# Patient Record
Sex: FEMALE | Race: WHITE | NOT HISPANIC OR LATINO | URBAN - METROPOLITAN AREA
[De-identification: names, ages, dates, MRNs, and addresses within clinical notes are randomized per-mention and may not be internally consistent; named-entity substitution may affect disease eponyms.]

---

## 2021-09-25 ENCOUNTER — EMERGENCY (EMERGENCY)
Facility: HOSPITAL | Age: 81
LOS: 1 days | Discharge: HOME | End: 2021-09-25
Attending: EMERGENCY MEDICINE | Admitting: EMERGENCY MEDICINE
Payer: MEDICARE

## 2021-09-25 VITALS
TEMPERATURE: 100 F | HEART RATE: 114 BPM | WEIGHT: 147.93 LBS | HEIGHT: 59 IN | OXYGEN SATURATION: 94 % | SYSTOLIC BLOOD PRESSURE: 109 MMHG | RESPIRATION RATE: 18 BRPM | DIASTOLIC BLOOD PRESSURE: 74 MMHG

## 2021-09-25 VITALS — TEMPERATURE: 101 F

## 2021-09-25 DIAGNOSIS — E78.5 HYPERLIPIDEMIA, UNSPECIFIED: ICD-10-CM

## 2021-09-25 DIAGNOSIS — R07.89 OTHER CHEST PAIN: ICD-10-CM

## 2021-09-25 DIAGNOSIS — Z79.83 LONG TERM (CURRENT) USE OF BISPHOSPHONATES: ICD-10-CM

## 2021-09-25 DIAGNOSIS — R51.9 HEADACHE, UNSPECIFIED: ICD-10-CM

## 2021-09-25 DIAGNOSIS — Z86.018 PERSONAL HISTORY OF OTHER BENIGN NEOPLASM: ICD-10-CM

## 2021-09-25 DIAGNOSIS — Z98.890 OTHER SPECIFIED POSTPROCEDURAL STATES: ICD-10-CM

## 2021-09-25 DIAGNOSIS — Z79.82 LONG TERM (CURRENT) USE OF ASPIRIN: ICD-10-CM

## 2021-09-25 DIAGNOSIS — I10 ESSENTIAL (PRIMARY) HYPERTENSION: ICD-10-CM

## 2021-09-25 DIAGNOSIS — Z98.89 OTHER SPECIFIED POSTPROCEDURAL STATES: Chronic | ICD-10-CM

## 2021-09-25 DIAGNOSIS — Z20.822 CONTACT WITH AND (SUSPECTED) EXPOSURE TO COVID-19: ICD-10-CM

## 2021-09-25 DIAGNOSIS — Z88.0 ALLERGY STATUS TO PENICILLIN: ICD-10-CM

## 2021-09-25 DIAGNOSIS — R00.0 TACHYCARDIA, UNSPECIFIED: ICD-10-CM

## 2021-09-25 DIAGNOSIS — R50.9 FEVER, UNSPECIFIED: ICD-10-CM

## 2021-09-25 DIAGNOSIS — Z88.5 ALLERGY STATUS TO NARCOTIC AGENT: ICD-10-CM

## 2021-09-25 DIAGNOSIS — L03.211 CELLULITIS OF FACE: ICD-10-CM

## 2021-09-25 DIAGNOSIS — Z88.2 ALLERGY STATUS TO SULFONAMIDES: ICD-10-CM

## 2021-09-25 LAB
ALBUMIN SERPL ELPH-MCNC: 3.7 G/DL — SIGNIFICANT CHANGE UP (ref 3.4–5)
ALP SERPL-CCNC: 233 U/L — HIGH (ref 40–120)
ALT FLD-CCNC: 66 U/L — HIGH (ref 12–42)
ANION GAP SERPL CALC-SCNC: 12 MMOL/L — SIGNIFICANT CHANGE UP (ref 9–16)
APPEARANCE UR: CLEAR — SIGNIFICANT CHANGE UP
APTT BLD: 36.1 SEC — HIGH (ref 27.5–35.5)
AST SERPL-CCNC: 27 U/L — SIGNIFICANT CHANGE UP (ref 15–37)
BASOPHILS # BLD AUTO: 0.05 K/UL — SIGNIFICANT CHANGE UP (ref 0–0.2)
BASOPHILS NFR BLD AUTO: 0.3 % — SIGNIFICANT CHANGE UP (ref 0–2)
BILIRUB SERPL-MCNC: 1.7 MG/DL — HIGH (ref 0.2–1.2)
BILIRUB UR-MCNC: NEGATIVE — SIGNIFICANT CHANGE UP
BUN SERPL-MCNC: 14 MG/DL — SIGNIFICANT CHANGE UP (ref 7–23)
CALCIUM SERPL-MCNC: 9.6 MG/DL — SIGNIFICANT CHANGE UP (ref 8.5–10.5)
CHLORIDE SERPL-SCNC: 106 MMOL/L — SIGNIFICANT CHANGE UP (ref 96–108)
CO2 SERPL-SCNC: 26 MMOL/L — SIGNIFICANT CHANGE UP (ref 22–31)
COLOR SPEC: YELLOW — SIGNIFICANT CHANGE UP
CREAT SERPL-MCNC: 0.83 MG/DL — SIGNIFICANT CHANGE UP (ref 0.5–1.3)
DIFF PNL FLD: ABNORMAL
EOSINOPHIL # BLD AUTO: 0 K/UL — SIGNIFICANT CHANGE UP (ref 0–0.5)
EOSINOPHIL NFR BLD AUTO: 0 % — SIGNIFICANT CHANGE UP (ref 0–6)
FLUAV H1 2009 PAND RNA SPEC QL NAA+PROBE: SIGNIFICANT CHANGE UP
FLUAV H1 RNA SPEC QL NAA+PROBE: SIGNIFICANT CHANGE UP
FLUAV H3 RNA SPEC QL NAA+PROBE: SIGNIFICANT CHANGE UP
FLUAV SUBTYP SPEC NAA+PROBE: SIGNIFICANT CHANGE UP
FLUBV RNA SPEC QL NAA+PROBE: SIGNIFICANT CHANGE UP
GLUCOSE SERPL-MCNC: 109 MG/DL — HIGH (ref 70–99)
GLUCOSE UR QL: NEGATIVE — SIGNIFICANT CHANGE UP
HADV DNA SPEC QL NAA+PROBE: SIGNIFICANT CHANGE UP
HCOV PNL SPEC NAA+PROBE: SIGNIFICANT CHANGE UP
HCT VFR BLD CALC: 47.1 % — HIGH (ref 34.5–45)
HGB BLD-MCNC: 15.5 G/DL — SIGNIFICANT CHANGE UP (ref 11.5–15.5)
HMPV RNA SPEC QL NAA+PROBE: SIGNIFICANT CHANGE UP
HPIV1 RNA SPEC QL NAA+PROBE: SIGNIFICANT CHANGE UP
HPIV2 RNA SPEC QL NAA+PROBE: SIGNIFICANT CHANGE UP
HPIV3 RNA SPEC QL NAA+PROBE: SIGNIFICANT CHANGE UP
HPIV4 RNA SPEC QL NAA+PROBE: SIGNIFICANT CHANGE UP
IMM GRANULOCYTES NFR BLD AUTO: 0.3 % — SIGNIFICANT CHANGE UP (ref 0–1.5)
INR BLD: 1.09 — SIGNIFICANT CHANGE UP (ref 0.88–1.16)
KETONES UR-MCNC: NEGATIVE — SIGNIFICANT CHANGE UP
LACTATE SERPL-SCNC: 2 MMOL/L — SIGNIFICANT CHANGE UP (ref 0.4–2)
LEUKOCYTE ESTERASE UR-ACNC: NEGATIVE — SIGNIFICANT CHANGE UP
LYMPHOCYTES # BLD AUTO: 1.92 K/UL — SIGNIFICANT CHANGE UP (ref 1–3.3)
LYMPHOCYTES # BLD AUTO: 13.1 % — SIGNIFICANT CHANGE UP (ref 13–44)
MCHC RBC-ENTMCNC: 30.3 PG — SIGNIFICANT CHANGE UP (ref 27–34)
MCHC RBC-ENTMCNC: 32.9 GM/DL — SIGNIFICANT CHANGE UP (ref 32–36)
MCV RBC AUTO: 92 FL — SIGNIFICANT CHANGE UP (ref 80–100)
MONOCYTES # BLD AUTO: 1.15 K/UL — HIGH (ref 0–0.9)
MONOCYTES NFR BLD AUTO: 7.9 % — SIGNIFICANT CHANGE UP (ref 2–14)
NEUTROPHILS # BLD AUTO: 11.44 K/UL — HIGH (ref 1.8–7.4)
NEUTROPHILS NFR BLD AUTO: 78.4 % — HIGH (ref 43–77)
NITRITE UR-MCNC: NEGATIVE — SIGNIFICANT CHANGE UP
NRBC # BLD: 0 /100 WBCS — SIGNIFICANT CHANGE UP (ref 0–0)
NT-PROBNP SERPL-SCNC: 333 PG/ML — HIGH
PH UR: 6.5 — SIGNIFICANT CHANGE UP (ref 5–8)
PLATELET # BLD AUTO: 250 K/UL — SIGNIFICANT CHANGE UP (ref 150–400)
POTASSIUM SERPL-MCNC: 3.5 MMOL/L — SIGNIFICANT CHANGE UP (ref 3.5–5.3)
POTASSIUM SERPL-SCNC: 3.5 MMOL/L — SIGNIFICANT CHANGE UP (ref 3.5–5.3)
PROT SERPL-MCNC: 7.3 G/DL — SIGNIFICANT CHANGE UP (ref 6.4–8.2)
PROT UR-MCNC: NEGATIVE MG/DL — SIGNIFICANT CHANGE UP
PROTHROM AB SERPL-ACNC: 12.8 SEC — SIGNIFICANT CHANGE UP (ref 10.6–13.6)
RAPID RVP RESULT: SIGNIFICANT CHANGE UP
RBC # BLD: 5.12 M/UL — SIGNIFICANT CHANGE UP (ref 3.8–5.2)
RBC # FLD: 14.7 % — HIGH (ref 10.3–14.5)
RBC CASTS # UR COMP ASSIST: < 5 /HPF — SIGNIFICANT CHANGE UP
RSV RNA SPEC QL NAA+PROBE: SIGNIFICANT CHANGE UP
RV+EV RNA SPEC QL NAA+PROBE: SIGNIFICANT CHANGE UP
SARS-COV-2 RNA SPEC QL NAA+PROBE: SIGNIFICANT CHANGE UP
SODIUM SERPL-SCNC: 144 MMOL/L — SIGNIFICANT CHANGE UP (ref 132–145)
SP GR SPEC: <=1.005 — SIGNIFICANT CHANGE UP (ref 1–1.03)
TROPONIN I SERPL-MCNC: <0.017 NG/ML — LOW (ref 0.02–0.06)
TSH SERPL-MCNC: 0.57 UIU/ML — SIGNIFICANT CHANGE UP (ref 0.36–3.74)
UROBILINOGEN FLD QL: 0.2 E.U./DL — SIGNIFICANT CHANGE UP
WBC # BLD: 14.61 K/UL — HIGH (ref 3.8–10.5)
WBC # FLD AUTO: 14.61 K/UL — HIGH (ref 3.8–10.5)
WBC UR QL: < 5 /HPF — SIGNIFICANT CHANGE UP

## 2021-09-25 PROCEDURE — 70487 CT MAXILLOFACIAL W/DYE: CPT | Mod: 26,MH

## 2021-09-25 PROCEDURE — 99285 EMERGENCY DEPT VISIT HI MDM: CPT | Mod: CS,25

## 2021-09-25 PROCEDURE — 93010 ELECTROCARDIOGRAM REPORT: CPT

## 2021-09-25 PROCEDURE — 71046 X-RAY EXAM CHEST 2 VIEWS: CPT | Mod: 26

## 2021-09-25 PROCEDURE — 71046 X-RAY EXAM CHEST 2 VIEWS: CPT | Mod: 26,77

## 2021-09-25 RX ORDER — FAMOTIDINE 10 MG/ML
20 INJECTION INTRAVENOUS ONCE
Refills: 0 | Status: COMPLETED | OUTPATIENT
Start: 2021-09-25 | End: 2021-09-25

## 2021-09-25 RX ORDER — SODIUM CHLORIDE 9 MG/ML
2100 INJECTION INTRAMUSCULAR; INTRAVENOUS; SUBCUTANEOUS ONCE
Refills: 0 | Status: COMPLETED | OUTPATIENT
Start: 2021-09-25 | End: 2021-09-25

## 2021-09-25 RX ORDER — ACETAMINOPHEN 500 MG
650 TABLET ORAL ONCE
Refills: 0 | Status: COMPLETED | OUTPATIENT
Start: 2021-09-25 | End: 2021-09-25

## 2021-09-25 RX ADMIN — Medication 100 MILLIGRAM(S): at 09:57

## 2021-09-25 RX ADMIN — SODIUM CHLORIDE 2100 MILLILITER(S): 9 INJECTION INTRAMUSCULAR; INTRAVENOUS; SUBCUTANEOUS at 09:53

## 2021-09-25 RX ADMIN — Medication 650 MILLIGRAM(S): at 09:54

## 2021-09-25 RX ADMIN — FAMOTIDINE 20 MILLIGRAM(S): 10 INJECTION INTRAVENOUS at 09:56

## 2021-09-25 RX ADMIN — Medication 30 MILLILITER(S): at 09:56

## 2021-09-25 NOTE — ED PROVIDER NOTE - PATIENT PORTAL LINK FT
constant/aching
You can access the FollowMyHealth Patient Portal offered by [facility] by registering at the following website: [email or web address]. By joining [employer]’s FollowMyHealth portal, you will also be able to view your health information using other applications (apps) compatible with our system.

## 2021-09-25 NOTE — ED PROVIDER NOTE - PROGRESS NOTE DETAILS
will treat as early cellulitis in the face, CT w no fluid collection. Will recommend ENT F/U as needed.

## 2021-09-25 NOTE — ED ADULT TRIAGE NOTE - CHIEF COMPLAINT QUOTE
Patient c/o right ear pain and tenderness, also reports having substernal chest burning since last night Patient c/o right ear pain and tenderness, also reports having substernal chest burning and shortness of breath  since last night

## 2021-09-25 NOTE — ED ADULT NURSE NOTE - OBJECTIVE STATEMENT
Patient to ED with intermittent ear pain for several weeks, pain to the anterior aspect of the R ear. also some intermittent fevers recently. Last night also had some anterior chest "burning", middle of chest, non radiating. no abd pain, no n./v/d, no sore throat. Pain in the ear radiates to the R side of the head w resulting headache at times.

## 2021-09-25 NOTE — ED ADULT NURSE NOTE - CHIEF COMPLAINT QUOTE
Patient c/o right ear pain and tenderness, also reports having substernal chest burning and shortness of breath  since last night

## 2021-09-25 NOTE — ED PROVIDER NOTE - OBJECTIVE STATEMENT
81 yo female pt, hx of HTN, HLD (amlodipine, lipitor), prior surgery for acoustic neuroma. Presents for intermitent ear pain for several weeks, pain to the anterior aspect of the R ear. also some intermitent fevers recently. Last night also had some anterior chest "burning", middle of chest, non radiating. no abd pain, no n./v/d, no sorethroat. Pain in the ear radiates to the R side of the head w resulting headache at times. Had her covid vaccine. no neck pain, no focal weakness, no blurred vision.

## 2021-09-25 NOTE — ED PROVIDER NOTE - CARE PROVIDER_API CALL
Richard Martínez)  Otolaryngology  [address] [place] [address]  Phone: [phone]  Fax: [phone]  Follow Up Time:

## 2021-09-25 NOTE — ED PROVIDER NOTE - CLINICAL SUMMARY MEDICAL DECISION MAKING FREE TEXT BOX
Pt presents w fever, tachycardia, ordered labs as per sepsis protocol, fluids, fever control and broad spectrum abx. R facial mass anterior to R ear palpated. Pt presents w fever, tachycardia, ordered labs as per sepsis protocol, fluids, fever control and broad spectrum abx. R facial mass anterior to R ear palpated, unclear etiology. Will do CT max/face to characterize this.

## 2021-09-25 NOTE — ED ADULT TRIAGE NOTE - HEART RATE (BEATS/MIN)
114 Same Histology In Subsequent Stages Text: The pattern and morphology of the tumor is as described in the first stage.

## 2021-09-25 NOTE — ED PROVIDER NOTE - ENMT, MLM
Airway patent, Nasal mucosa clear. Mouth with normal mucosa. Throat has no vesicles, no oropharyngeal exudates and uvula is midline. R ear normal TM, no erythema, clear canal, mastoid area defect (prior acoustic neuroma sx), mass palpated in anterior aspect of R ear, tender, no fluctuance, no drainage

## 2021-09-27 ENCOUNTER — APPOINTMENT (OUTPATIENT)
Dept: OTOLARYNGOLOGY | Facility: CLINIC | Age: 81
End: 2021-09-27
Payer: MEDICARE

## 2021-09-27 VITALS
DIASTOLIC BLOOD PRESSURE: 82 MMHG | HEART RATE: 106 BPM | SYSTOLIC BLOOD PRESSURE: 132 MMHG | WEIGHT: 165 LBS | OXYGEN SATURATION: 94 % | HEIGHT: 59 IN | TEMPERATURE: 97.8 F | BODY MASS INDEX: 33.26 KG/M2

## 2021-09-27 DIAGNOSIS — Z82.49 FAMILY HISTORY OF ISCHEMIC HEART DISEASE AND OTHER DISEASES OF THE CIRCULATORY SYSTEM: ICD-10-CM

## 2021-09-27 DIAGNOSIS — M26.629 ARTHRALGIA OF TEMPOROMANDIBULAR JOINT,: ICD-10-CM

## 2021-09-27 DIAGNOSIS — Z82.3 FAMILY HISTORY OF STROKE: ICD-10-CM

## 2021-09-27 DIAGNOSIS — F45.8 OTHER SOMATOFORM DISORDERS: ICD-10-CM

## 2021-09-27 DIAGNOSIS — H91.91 UNSPECIFIED HEARING LOSS, RIGHT EAR: ICD-10-CM

## 2021-09-27 DIAGNOSIS — H91.92 UNSPECIFIED HEARING LOSS, LEFT EAR: ICD-10-CM

## 2021-09-27 DIAGNOSIS — Z87.891 PERSONAL HISTORY OF NICOTINE DEPENDENCE: ICD-10-CM

## 2021-09-27 DIAGNOSIS — K21.9 GASTRO-ESOPHAGEAL REFLUX DISEASE W/OUT ESOPHAGITIS: ICD-10-CM

## 2021-09-27 DIAGNOSIS — H61.23 IMPACTED CERUMEN, BILATERAL: ICD-10-CM

## 2021-09-27 PROCEDURE — 92557 COMPREHENSIVE HEARING TEST: CPT

## 2021-09-27 PROCEDURE — 92550 TYMPANOMETRY & REFLEX THRESH: CPT

## 2021-09-27 PROCEDURE — 99204 OFFICE O/P NEW MOD 45 MIN: CPT | Mod: 25

## 2021-09-27 PROCEDURE — G0268 REMOVAL OF IMPACTED WAX MD: CPT

## 2021-09-27 NOTE — ASSESSMENT
[FreeTextEntry1] : Discussed appropriate use of hearing protection & loud noise avoidance. Discussed the importance not putting qtips or other foreign objects in the ears. Annual audios sooner prn\par \par Discussed conservative TMJ treatment including hot soaks, NSAIDs, and chewing avoidance. Asked the patient to confer with their dentist regarding a possible night splint. Discussed bruxism & its possible contribution to TMJD. Reviewed relaxation exercises & a possible trial of muscle relaxants as well as possible eventual botox.\par \par Regarding her acoustic, she has an appt to see her [facility] neurologist & declines an order for a f/u MR (she missed a scheduled one during the pandemic). \par \par

## 2021-09-27 NOTE — HISTORY OF PRESENT ILLNESS
[de-identified] : Hx surgery for a R sided acoustic; this came to light at that time d/t earaches. Now w/ 6 weeks of R ear pain worse with chewing which began some period of time after a prolonged dental extraction.\par No hearing in the R ear; denies tinnitus. L ear is ok. No vertigo \par Known bruxism & her dentist has discussed a possible night splint. \par Acid reflux controlled w/ daily pantoprazole.

## 2021-09-27 NOTE — PHYSICAL EXAM
[de-identified] : extremely tender R TMJ w/o clicking; after manual reduction of the cartilage disk her pain level improved significantly  [Binocular Microscopic Exam] : Binocular microscopic exam was performed [FreeTextEntry8] : cerumen impaction removed with suction [FreeTextEntry9] : cerumen impaction removed with suction [Normal] : no rashes

## 2021-09-27 NOTE — DATA REVIEWED
[de-identified] : today: L mod-sev HFHL AS, profound loss AD\par - Immitance testing w/ type A AU\par  [de-identified] : CT max-fac reviewed primarily w/ extensive R TMJ STEFFANIE

## 2021-09-30 LAB
CULTURE RESULTS: SIGNIFICANT CHANGE UP
CULTURE RESULTS: SIGNIFICANT CHANGE UP
SPECIMEN SOURCE: SIGNIFICANT CHANGE UP
SPECIMEN SOURCE: SIGNIFICANT CHANGE UP

## 2021-10-01 RX ORDER — FAMOTIDINE 20 MG/1
20 TABLET, FILM COATED ORAL TWICE DAILY
Qty: 60 | Refills: 5 | Status: ACTIVE | COMMUNITY
Start: 2021-10-01 | End: 1900-01-01

## 2022-09-10 ENCOUNTER — EMERGENCY (EMERGENCY)
Facility: HOSPITAL | Age: 82
LOS: 1 days | Discharge: HOME | End: 2022-09-10
Admitting: EMERGENCY MEDICINE

## 2022-09-10 VITALS
RESPIRATION RATE: 16 BRPM | HEIGHT: 59 IN | TEMPERATURE: 98 F | DIASTOLIC BLOOD PRESSURE: 83 MMHG | HEART RATE: 100 BPM | SYSTOLIC BLOOD PRESSURE: 142 MMHG | WEIGHT: 154.98 LBS | OXYGEN SATURATION: 95 %

## 2022-09-10 DIAGNOSIS — Z98.89 OTHER SPECIFIED POSTPROCEDURAL STATES: Chronic | ICD-10-CM

## 2022-09-10 PROCEDURE — 73610 X-RAY EXAM OF ANKLE: CPT | Mod: 26,RT

## 2022-09-10 PROCEDURE — 73630 X-RAY EXAM OF FOOT: CPT | Mod: 26,RT

## 2022-09-10 PROCEDURE — 99283 EMERGENCY DEPT VISIT LOW MDM: CPT | Mod: 25

## 2022-09-10 NOTE — ED PROVIDER NOTE - CARE PROVIDER_API CALL
Germain Khan)  Orthopaedic Surgery  [address] [place] [address], [address]  [place] [address]  Phone: [phone]  Fax: [phone]  Follow Up Time:

## 2022-09-10 NOTE — ED PROVIDER NOTE - OBJECTIVE STATEMENT
82 y/o F with PMH of PAD presents to the ED for R ankle pain x1 week. Pt reports she was in a hurry and she may have twisted her ankle. She is able to ambulate but with pain. She denies numbness, tingling, weakness, or any additional injuries.

## 2022-09-10 NOTE — ED PROVIDER NOTE - CLINICAL SUMMARY MEDICAL DECISION MAKING FREE TEXT BOX
right foot/posterior ankle pain x 1 week, possible sprain, nvi, xrays prelim neg for fracture or dislocation, otc pain meds, supportive care, f/u ortho.

## 2022-09-10 NOTE — ED ADULT NURSE NOTE - OBJECTIVE STATEMENT
Pt aox3 with steady gait. pt states foot pain for 5 days unsure of injury walking with cane , right foot. no deformity, slight swelling

## 2022-09-10 NOTE — ED PROVIDER NOTE - PHYSICAL EXAMINATION
VITAL SIGNS: I have reviewed nursing notes and confirm.  CONSTITUTIONAL: Well-developed; well-nourished; in no acute distress.  SKIN: Skin exam is warm and dry, no acute rash.  HEAD: Normocephalic; atraumatic.  EYES: PERRL, EOM intact; conjunctiva and sclera clear.  ENT: No nasal discharge; airway clear.  NECK: Supple; non tender.  CARD: S1, S2 normal; no murmurs, gallops, or rubs. Regular rate and rhythm.  RESP: Unlabored. No wheezes, rales or rhonchi.  ABD: soft; non-distended; non-tender  MSK (RLE): Normal appearance. Mild Tenderness to palpation over the base of the 5th metatarsal and posterior ankle. DPI. Normal temperature and color.   LYMPH: No acute cervical adenopathy.  NEURO: Alert, oriented. Grossly unremarkable.  PSYCH: Cooperative, appropriate. VITAL SIGNS: I have reviewed nursing notes and confirm.  CONSTITUTIONAL: Well-developed; well-nourished; in no acute distress.  SKIN: Skin exam is warm and dry, no acute rash.  MSK (RLE): Normal appearance. Mild Tenderness to palpation over the base of the 5th metatarsal and posterior ankle. DPI. Normal temperature and color. good cap refill. ambulatory with no difficulty.   PSYCH: Cooperative, appropriate.

## 2022-09-10 NOTE — ED ADULT NURSE NOTE - NSICDXPASTMEDICALHX_GEN_ALL_CORE_FT
PAST MEDICAL HISTORY:  Acoustic neuroma     Diverticulitis of intestine without perforation or abscess with bleeding, unspecified part of intestinal tract     Duodenal mass     Hiatal hernia     Kidney stone     PAD (peripheral artery disease)

## 2022-09-10 NOTE — ED PROVIDER NOTE - PATIENT PORTAL LINK FT
You can access the FollowMyHealth Patient Portal offered by [facility] by registering at the following website: [email or web address]. By joining [employer]’s FollowMyHealth portal, you will also be able to view your health information using other applications (apps) compatible with our system.

## 2022-09-10 NOTE — ED PROVIDER NOTE - NSFOLLOWUPINSTRUCTIONS_ED_ALL_ED_FT
Take Ibuprofen 600mg every 6-8 hours as needed for pain, take with food, and in addition you may take Tylenol 500 mg every 6-8 hours as needed for pain  Rest. Cool compresses.  Extremity elevation.  Ace wrap    Follow up with Orthopedics within 1 week    Return for any concerning or worsening symptoms.

## 2022-09-12 DIAGNOSIS — Z79.82 LONG TERM (CURRENT) USE OF ASPIRIN: ICD-10-CM

## 2022-09-12 DIAGNOSIS — Z88.2 ALLERGY STATUS TO SULFONAMIDES: ICD-10-CM

## 2022-09-12 DIAGNOSIS — Z88.5 ALLERGY STATUS TO NARCOTIC AGENT: ICD-10-CM
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
## 2022-09-12 DIAGNOSIS — M25.571 PAIN IN RIGHT ANKLE AND JOINTS OF RIGHT FOOT: ICD-10-CM

## 2022-09-12 DIAGNOSIS — Z79.02 LONG TERM (CURRENT) USE OF ANTITHROMBOTICS/ANTIPLATELETS: ICD-10-CM

## 2022-09-12 DIAGNOSIS — I73.9 PERIPHERAL VASCULAR DISEASE, UNSPECIFIED: ICD-10-CM

## 2022-09-12 DIAGNOSIS — Z88.0 ALLERGY STATUS TO PENICILLIN: ICD-10-CM

## 2022-09-12 DIAGNOSIS — M79.671 PAIN IN RIGHT FOOT: ICD-10-CM
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
## 2022-10-29 NOTE — ED ADULT NURSE NOTE - CAS TRG GENERAL NORM CIRC DET
- OVER-THE-COUNTER Tylenol/Ibuprofen over the counter as needed for fever/pain  - allegra OR claritin OR zyrtec over the counter antihistamine may help with ear itching/fluid  - you can use Flonase over the counter to help with fluid behind ears/congestion/post nasal drip (one spray each nostril twice daily OR two sprays each nostril once daily).       Please arrange follow up with your primary medical clinic as soon as possible within 1-2 weeks. You must understand that you've received an Urgent Care treatment only and that you may be released before all of your medical problems are known or treated. You, the patient, will arrange for follow up as instructed. If your symptoms worsen or fail to improve you should go to the Emergency Room.     Strong peripheral pulses/Capillary refill less/equal to 2 seconds

## 2023-05-09 ENCOUNTER — EMERGENCY (EMERGENCY)
Facility: HOSPITAL | Age: 83
LOS: 1 days | Discharge: HOME | End: 2023-05-09
Attending: EMERGENCY MEDICINE | Admitting: EMERGENCY MEDICINE
Payer: MEDICARE

## 2023-05-09 VITALS
OXYGEN SATURATION: 93 % | DIASTOLIC BLOOD PRESSURE: 83 MMHG | HEART RATE: 95 BPM | WEIGHT: 149.91 LBS | TEMPERATURE: 98 F | SYSTOLIC BLOOD PRESSURE: 138 MMHG | HEIGHT: 59 IN | RESPIRATION RATE: 16 BRPM

## 2023-05-09 VITALS
OXYGEN SATURATION: 95 % | SYSTOLIC BLOOD PRESSURE: 145 MMHG | RESPIRATION RATE: 17 BRPM | TEMPERATURE: 98 F | HEART RATE: 79 BPM | DIASTOLIC BLOOD PRESSURE: 89 MMHG

## 2023-05-09 DIAGNOSIS — Z98.89 OTHER SPECIFIED POSTPROCEDURAL STATES: Chronic | ICD-10-CM

## 2023-05-09 LAB
APPEARANCE UR: CLEAR — SIGNIFICANT CHANGE UP
BACTERIA # UR AUTO: PRESENT /HPF
BILIRUB UR-MCNC: ABNORMAL
COLOR SPEC: YELLOW — SIGNIFICANT CHANGE UP
COMMENT - URINE: SIGNIFICANT CHANGE UP
DIFF PNL FLD: NEGATIVE — SIGNIFICANT CHANGE UP
EPI CELLS # UR: ABNORMAL /HPF (ref 0–5)
FLUAV H1 2009 PAND RNA SPEC QL NAA+PROBE: SIGNIFICANT CHANGE UP
FLUAV H1 RNA SPEC QL NAA+PROBE: SIGNIFICANT CHANGE UP
FLUAV H3 RNA SPEC QL NAA+PROBE: SIGNIFICANT CHANGE UP
FLUAV SUBTYP SPEC NAA+PROBE: SIGNIFICANT CHANGE UP
FLUBV RNA SPEC QL NAA+PROBE: SIGNIFICANT CHANGE UP
GLUCOSE UR QL: NEGATIVE — SIGNIFICANT CHANGE UP
HYALINE CASTS # UR AUTO: ABNORMAL /LPF (ref 0–2)
KETONES UR-MCNC: NEGATIVE — SIGNIFICANT CHANGE UP
LEUKOCYTE ESTERASE UR-ACNC: ABNORMAL
NITRITE UR-MCNC: NEGATIVE — SIGNIFICANT CHANGE UP
PH UR: 6 — SIGNIFICANT CHANGE UP (ref 5–8)
PROT UR-MCNC: NEGATIVE MG/DL — SIGNIFICANT CHANGE UP
RAPID RVP RESULT: SIGNIFICANT CHANGE UP
RBC CASTS # UR COMP ASSIST: < 5 /HPF — SIGNIFICANT CHANGE UP
SARS-COV-2 RNA SPEC QL NAA+PROBE: SIGNIFICANT CHANGE UP
SP GR SPEC: 1.02 — SIGNIFICANT CHANGE UP (ref 1–1.03)
UROBILINOGEN FLD QL: 1 E.U./DL — SIGNIFICANT CHANGE UP
WBC UR QL: ABNORMAL /HPF

## 2023-05-09 PROCEDURE — 99284 EMERGENCY DEPT VISIT MOD MDM: CPT

## 2023-05-09 PROCEDURE — 71046 X-RAY EXAM CHEST 2 VIEWS: CPT | Mod: 26

## 2023-05-09 RX ORDER — CEFUROXIME AXETIL 250 MG
1 TABLET ORAL
Qty: 14 | Refills: 0
Start: 2023-05-09 | End: 2023-05-15

## 2023-05-09 RX ORDER — OMEPRAZOLE 10 MG/1
1 CAPSULE, DELAYED RELEASE ORAL
Qty: 30 | Refills: 2
Start: 2023-05-09 | End: 2023-08-06

## 2023-05-09 RX ORDER — LEVOFLOXACIN 5 MG/ML
1 INJECTION, SOLUTION INTRAVENOUS
Qty: 5 | Refills: 0
Start: 2023-05-09 | End: 2023-05-13

## 2023-05-09 RX ORDER — BACILLUS COAGULANS/INULIN 1B-250 MG
1 CAPSULE ORAL
Qty: 30 | Refills: 2
Start: 2023-05-09 | End: 2023-08-06

## 2023-05-09 NOTE — ED PROVIDER NOTE - CARE PROVIDERS DIRECT ADDRESSES
,DirectAddress_Unknown,[email or web address]; to (do).net

## 2023-05-09 NOTE — ED PROVIDER NOTE - PATIENT PORTAL LINK FT
You can access the FollowMyHealth Patient Portal offered by [facility] by registering at the following website: [email or web address]. By joining [employer]’s FollowMyHealth portal, you will also be able to view your health information using other applications (apps) compatible with our system.

## 2023-05-09 NOTE — ED PROVIDER NOTE - PROGRESS NOTE DETAILS
Pt's RVP neg and UA looks +ve. I spoke to the patient and sent an RX for Levaquin to cover urine and pulm sources (PCN allergy- swelling). Ucx sent.

## 2023-05-09 NOTE — ED PROVIDER NOTE - CARE PROVIDER_API CALL
Lilliam Parry  GASTROENTEROLOGY  [address] [place] [address], [address]  [place] [address]  Phone: [phone]  Fax: [phone]  Follow Up Time:     Celine Seaman; MPH)  Internal Medicine  [address] [place] [address]  Phone: [phone]  Fax: [phone]  Follow Up Time:     Naomy Sanchez ()  [place] [address]  Phone: [phone]  Fax: [phone]  Follow Up Time:

## 2023-05-09 NOTE — ED PROVIDER NOTE - NSFOLLOWUPINSTRUCTIONS_ED_ALL_ED_FT
Suspected Viral Respiratory Infection    A viral respiratory infection is an illness that affects parts of the body used for breathing, like the lungs, nose, and throat. It is caused by a germ called a virus. Symptoms can include runny nose, coughing, sneezing, fatigue, body aches, sore throat, fever, or headache. Over the counter medicine can be used to manage the symptoms but the infection typically goes away on its own in 5 to 10 days.     SEEK IMMEDIATE MEDICAL CARE IF YOU HAVE ANY OF THE FOLLOWING SYMPTOMS: shortness of breath, chest pain, fever over 10 days, or lightheadedness/dizziness.     Conservative management discussed with the patient in detail.  Close PMD follow up encouraged.  Strict ED return instructions discussed in detail and patient given the opportunity to ask any questions about their discharge diagnosis.    I also suspect that you had some acid reflux likely secondary to the caffeine and the ice tea.  Please continue your Prilosec and Pepcid as we discussed.    If any of your symptoms get worse please come back to the emergency department for reassessment.    I am sending a refill for Prilosec to your pharmacy.  I will call you with the results of your urinalysis and your viral swab

## 2023-05-09 NOTE — ED PROVIDER NOTE - NSICDXPASTMEDICALHX_GEN_ALL_CORE_FT
PAST MEDICAL HISTORY:  Acoustic neuroma     Diverticulitis of intestine without perforation or abscess with bleeding, unspecified part of intestinal tract     Duodenal mass     Hiatal hernia     Kidney stone     PAD (peripheral artery disease)       HTN (hypertension)

## 2023-05-09 NOTE — ED PROVIDER NOTE - PROVIDER TOKENS
PROVIDER:[TOKEN:[[id number]]],PROVIDER:[TOKEN:[[id number]]],PROVIDER:[TOKEN:[[id number]]]

## 2023-05-09 NOTE — ED ADULT TRIAGE NOTE - CHIEF COMPLAINT QUOTE
Pt states "I had a pain below my sternum" on friday. Pt states it went away but then "had a low grade fever" the next day. Pt states the pain has returned on and off since friday. Pt states with pain she had nausea but denies vomiting or diarrhea. hx htn. Pt states she took prilosec and tylenol this am which helped with the pain.

## 2023-05-09 NOTE — ED PROVIDER NOTE - OBJECTIVE STATEMENT
83 y/o F with PMHx of HTN (on amlodipine) presents to ED c/o intermittent episodes of "burning" in the sternum x5 days. Pt states the first episode occurred on 5/5/23, 30 minutes after drinking a bottle of green tea, and resolved on its own. She has since been having similar episodes which she has noted to improve with Omeprazole. Also noting fevers/chills x3 days (Tmax 101.3). Denies N/V/D, cough, or difficulty breathing. 81 y/o F with PMHx of HTN (on amlodipine) presents to ED c/o intermittent episodes of "burning" in the sternum x5 days. Pt states the first episode occurred on 5/5/23, 30 minutes after drinking a bottle of green tea, and resolved on its own after 2-3 hours. She has since been having similar episodes which have improved with omeprazole and pepcid. Also noting fevers/chills with intermittent SOB x3 days (Tmax 101.3). Denies N/V/D, cough, or urinary symptoms. 83 y/o F with PMHx of HTN (on amlodipine) presents to ED c/o intermittent episodes of "burning" in the sternum x5 days. Pt states the first episode occurred on 5/5/23, 30 minutes after drinking a bottle of green tea, and resolved on its own after 2-3 hours. She has since been having similar episodes which have improved with omeprazole and pepcid. Also noting fevers/chills with intermittent SOB x3 days (Tmax 101.3). Denies N/V/D, cough, or urinary symptoms.    Phone #: [phone], [phone]

## 2023-05-09 NOTE — ED PROVIDER NOTE - CLINICAL SUMMARY MEDICAL DECISION MAKING FREE TEXT BOX
81 y/o F presents to ED c/o intermittent episodes of "burning" under the sternum which have improved with omeprazole and pepcid. Also with fevers/chills and some SOB x3 days. Exam is unremarkable. Will check CXR, RVP, UA, and reassess. 83 y/o F presents to ED c/o intermittent episodes of "burning" under the sternum which have improved with Omeprazole and Pepcid. Also with fevers/chills x 1d and some on/off malaise, fatigue, bodyaches, SOB x3 days. Exam is unremarkable. Will check CXR, RVP, UA, and reassess. I suspect a viral syndrome with superimposed GERD.

## 2023-05-11 DIAGNOSIS — Z20.822 CONTACT WITH AND (SUSPECTED) EXPOSURE TO COVID-19: ICD-10-CM

## 2023-05-11 DIAGNOSIS — Z87.891 PERSONAL HISTORY OF NICOTINE DEPENDENCE: ICD-10-CM

## 2023-05-11 DIAGNOSIS — Z88.0 ALLERGY STATUS TO PENICILLIN: ICD-10-CM

## 2023-05-11 DIAGNOSIS — Z88.2 ALLERGY STATUS TO SULFONAMIDES: ICD-10-CM

## 2023-05-11 DIAGNOSIS — Z79.02 LONG TERM (CURRENT) USE OF ANTITHROMBOTICS/ANTIPLATELETS: ICD-10-CM

## 2023-05-11 DIAGNOSIS — I49.3 VENTRICULAR PREMATURE DEPOLARIZATION: ICD-10-CM

## 2023-05-11 DIAGNOSIS — R50.9 FEVER, UNSPECIFIED: ICD-10-CM

## 2023-05-11 DIAGNOSIS — Z79.82 LONG TERM (CURRENT) USE OF ASPIRIN: ICD-10-CM

## 2023-05-11 DIAGNOSIS — B34.9 VIRAL INFECTION, UNSPECIFIED: ICD-10-CM

## 2023-05-11 DIAGNOSIS — K21.9 GASTRO-ESOPHAGEAL REFLUX DISEASE WITHOUT ESOPHAGITIS: ICD-10-CM

## 2023-05-11 DIAGNOSIS — Z87.442 PERSONAL HISTORY OF URINARY CALCULI: ICD-10-CM

## 2023-05-11 DIAGNOSIS — I73.9 PERIPHERAL VASCULAR DISEASE, UNSPECIFIED: ICD-10-CM

## 2023-05-11 DIAGNOSIS — I10 ESSENTIAL (PRIMARY) HYPERTENSION: ICD-10-CM

## 2023-05-11 DIAGNOSIS — I49.8 OTHER SPECIFIED CARDIAC ARRHYTHMIAS: ICD-10-CM

## 2023-05-11 LAB
CULTURE RESULTS: SIGNIFICANT CHANGE UP
SPECIMEN SOURCE: SIGNIFICANT CHANGE UP

## 2023-05-12 NOTE — ED POST DISCHARGE NOTE - RESULT SUMMARY
>3 org in UCx, contaminated. Pt treated w/ Levaquin for concern of poss  source for symptoms - called patient to f/u if her symptoms improved - LVM.

## 2023-05-21 ENCOUNTER — EMERGENCY (EMERGENCY)
Facility: HOSPITAL | Age: 83
LOS: 1 days | Discharge: HOME | End: 2023-05-21
Admitting: EMERGENCY MEDICINE
Payer: MEDICARE

## 2023-05-21 VITALS
RESPIRATION RATE: 18 BRPM | DIASTOLIC BLOOD PRESSURE: 83 MMHG | OXYGEN SATURATION: 95 % | SYSTOLIC BLOOD PRESSURE: 135 MMHG | TEMPERATURE: 98 F | HEART RATE: 65 BPM

## 2023-05-21 VITALS
HEIGHT: 59 IN | SYSTOLIC BLOOD PRESSURE: 164 MMHG | TEMPERATURE: 98 F | OXYGEN SATURATION: 94 % | DIASTOLIC BLOOD PRESSURE: 85 MMHG | WEIGHT: 149.91 LBS | HEART RATE: 91 BPM | RESPIRATION RATE: 16 BRPM

## 2023-05-21 DIAGNOSIS — Z98.89 OTHER SPECIFIED POSTPROCEDURAL STATES: Chronic | ICD-10-CM

## 2023-05-21 PROBLEM — I10 ESSENTIAL (PRIMARY) HYPERTENSION: Chronic | Status: ACTIVE | Noted: 2023-05-09

## 2023-05-21 LAB
APPEARANCE UR: CLEAR — SIGNIFICANT CHANGE UP
BACTERIA # UR AUTO: ABNORMAL /HPF
BILIRUB UR-MCNC: NEGATIVE — SIGNIFICANT CHANGE UP
COLOR SPEC: YELLOW — SIGNIFICANT CHANGE UP
DIFF PNL FLD: NEGATIVE — SIGNIFICANT CHANGE UP
EPI CELLS # UR: ABNORMAL /HPF (ref 0–5)
GLUCOSE UR QL: NEGATIVE — SIGNIFICANT CHANGE UP
KETONES UR-MCNC: ABNORMAL MG/DL
LEUKOCYTE ESTERASE UR-ACNC: ABNORMAL
NITRITE UR-MCNC: NEGATIVE — SIGNIFICANT CHANGE UP
PH UR: 5.5 — SIGNIFICANT CHANGE UP (ref 5–8)
PROT UR-MCNC: NEGATIVE MG/DL — SIGNIFICANT CHANGE UP
RBC CASTS # UR COMP ASSIST: < 5 /HPF — SIGNIFICANT CHANGE UP
SP GR SPEC: 1.02 — SIGNIFICANT CHANGE UP (ref 1–1.03)
UROBILINOGEN FLD QL: 0.2 E.U./DL — SIGNIFICANT CHANGE UP
WBC UR QL: > 10 /HPF

## 2023-05-21 PROCEDURE — 99284 EMERGENCY DEPT VISIT MOD MDM: CPT

## 2023-05-21 PROCEDURE — 71045 X-RAY EXAM CHEST 1 VIEW: CPT | Mod: 26

## 2023-05-21 RX ORDER — CEFUROXIME AXETIL 250 MG
1 TABLET ORAL
Qty: 20 | Refills: 0
Start: 2023-05-21 | End: 2023-05-30

## 2023-05-21 RX ORDER — SUCRALFATE 1 G
1 TABLET ORAL ONCE
Refills: 0 | Status: COMPLETED | OUTPATIENT
Start: 2023-05-21 | End: 2023-05-21

## 2023-05-21 RX ORDER — FAMOTIDINE 10 MG/ML
20 INJECTION INTRAVENOUS ONCE
Refills: 0 | Status: COMPLETED | OUTPATIENT
Start: 2023-05-21 | End: 2023-05-21

## 2023-05-21 RX ADMIN — Medication 1 GRAM(S): at 12:43

## 2023-05-21 RX ADMIN — Medication 30 MILLILITER(S): at 12:44

## 2023-05-21 RX ADMIN — FAMOTIDINE 20 MILLIGRAM(S): 10 INJECTION INTRAVENOUS at 12:43

## 2023-05-21 NOTE — ED PROVIDER NOTE - CLINICAL SUMMARY MEDICAL DECISION MAKING FREE TEXT BOX
Patient here with multiple complaints of reflux and increased urination.   Symptomatically treated and relieved     Will send abx for UTI    Asymptomatic at time of discharge

## 2023-05-21 NOTE — ED PROVIDER NOTE - PATIENT PORTAL LINK FT
You can access the FollowMyHealth Patient Portal offered by [facility] by registering at the following website: [email or web address]. By joining [employer]’s FollowMyHealth portal, you will also be able to view your health information using other applications (apps) compatible with our system.

## 2023-05-21 NOTE — ED ADULT TRIAGE NOTE - CHIEF COMPLAINT QUOTE
epigastric pain since last pm, pmh of gerds, pt states pain feels the same as her normal acid reflux

## 2023-05-21 NOTE — ED PROVIDER NOTE - OBJECTIVE STATEMENT
83 y/o female here stating her reflux symptoms have been worsened since last night. Patient also endorses increased urination. Recently treated for PNA and UTI. Appearing well and vibrant. Denies fever, chills, hematuria, vaginal bleeding, d/c, abdominal pain, change in bowel function, flank pain, rash, HA, dizziness, SOB, CP, palpitations, diaphoresis, cough, and malaise.

## 2023-05-21 NOTE — ED PROVIDER NOTE - NSICDXPASTMEDICALHX_GEN_ALL_CORE_FT
PAST MEDICAL HISTORY:  Acoustic neuroma     Diverticulitis of intestine without perforation or abscess with bleeding, unspecified part of intestinal tract     Duodenal mass     Hiatal hernia     HTN (hypertension)     Kidney stone     PAD (peripheral artery disease)

## 2023-05-21 NOTE — ED ADULT NURSE NOTE - NSFALLUNIVINTERV_ED_ALL_ED
Bed/Stretcher in lowest position, wheels locked, appropriate side rails in place/Call bell, personal items and telephone in reach/Instruct patient to call for assistance before getting out of bed/chair/stretcher/Non-slip footwear applied when patient is off stretcher/[place] to call system/Physically safe environment - no spills, clutter or unnecessary equipment/Purposeful proactive rounding/Room/bathroom lighting operational, light cord in reach

## 2023-05-23 ENCOUNTER — INPATIENT (INPATIENT)
Facility: HOSPITAL | Age: 83
LOS: 2 days | Discharge: HOME | DRG: 247 | End: 2023-05-26
Attending: INTERNAL MEDICINE | Admitting: INTERNAL MEDICINE
Payer: MEDICARE

## 2023-05-23 VITALS
TEMPERATURE: 98 F | HEIGHT: 59 IN | RESPIRATION RATE: 18 BRPM | HEART RATE: 84 BPM | SYSTOLIC BLOOD PRESSURE: 168 MMHG | WEIGHT: 149.91 LBS | DIASTOLIC BLOOD PRESSURE: 89 MMHG | OXYGEN SATURATION: 94 %

## 2023-05-23 DIAGNOSIS — Z98.89 OTHER SPECIFIED POSTPROCEDURAL STATES: Chronic | ICD-10-CM

## 2023-05-23 DIAGNOSIS — I21.4 NON-ST ELEVATION (NSTEMI) MYOCARDIAL INFARCTION: ICD-10-CM

## 2023-05-23 DIAGNOSIS — I10 ESSENTIAL (PRIMARY) HYPERTENSION: ICD-10-CM

## 2023-05-23 DIAGNOSIS — E78.5 HYPERLIPIDEMIA, UNSPECIFIED: ICD-10-CM

## 2023-05-23 LAB
A1C WITH ESTIMATED AVERAGE GLUCOSE RESULT: 5.2 % — SIGNIFICANT CHANGE UP (ref 4–5.6)
ALBUMIN SERPL ELPH-MCNC: 3.4 G/DL — SIGNIFICANT CHANGE UP (ref 3.4–5)
ALP SERPL-CCNC: 134 U/L — HIGH (ref 40–120)
ALT FLD-CCNC: 28 U/L — SIGNIFICANT CHANGE UP (ref 12–42)
ANION GAP SERPL CALC-SCNC: 3 MMOL/L — LOW (ref 9–16)
APPEARANCE UR: CLEAR — SIGNIFICANT CHANGE UP
APTT BLD: 34 SEC — SIGNIFICANT CHANGE UP (ref 27.5–35.5)
AST SERPL-CCNC: 64 U/L — HIGH (ref 15–37)
BACTERIA # UR AUTO: PRESENT /HPF
BASE EXCESS BLDA CALC-SCNC: 1.7 MMOL/L — SIGNIFICANT CHANGE UP (ref -2–3)
BASOPHILS # BLD AUTO: 0.05 K/UL — SIGNIFICANT CHANGE UP (ref 0–0.2)
BASOPHILS NFR BLD AUTO: 0.5 % — SIGNIFICANT CHANGE UP (ref 0–2)
BILIRUB SERPL-MCNC: 1.2 MG/DL — SIGNIFICANT CHANGE UP (ref 0.2–1.2)
BILIRUB UR-MCNC: NEGATIVE — SIGNIFICANT CHANGE UP
BUN SERPL-MCNC: 14 MG/DL — SIGNIFICANT CHANGE UP (ref 7–23)
CA-I BLDA-SCNC: 1.13 MMOL/L — LOW (ref 1.15–1.33)
CALCIUM SERPL-MCNC: 9 MG/DL — SIGNIFICANT CHANGE UP (ref 8.5–10.5)
CHLORIDE SERPL-SCNC: 107 MMOL/L — SIGNIFICANT CHANGE UP (ref 96–108)
CHOLEST SERPL-MCNC: 210 MG/DL — HIGH
CK MB CFR SERPL CALC: 53.4 NG/ML — HIGH (ref 0–6.7)
CK SERPL-CCNC: 439 U/L — HIGH (ref 25–170)
CO2 BLDA-SCNC: 28 MMOL/L — HIGH (ref 19–24)
CO2 SERPL-SCNC: 33 MMOL/L — HIGH (ref 22–31)
COHGB MFR BLDA: 2 % — SIGNIFICANT CHANGE UP
COHGB MFR BLDA: 2.3 % — SIGNIFICANT CHANGE UP
COHGB MFR BLDV: 1.7 % — SIGNIFICANT CHANGE UP
COLOR SPEC: YELLOW — SIGNIFICANT CHANGE UP
CREAT SERPL-MCNC: 0.73 MG/DL — SIGNIFICANT CHANGE UP (ref 0.5–1.3)
DIFF PNL FLD: NEGATIVE — SIGNIFICANT CHANGE UP
EGFR: 81 ML/MIN/1.73M2 — SIGNIFICANT CHANGE UP
EOSINOPHIL # BLD AUTO: 0.01 K/UL — SIGNIFICANT CHANGE UP (ref 0–0.5)
EOSINOPHIL NFR BLD AUTO: 0.1 % — SIGNIFICANT CHANGE UP (ref 0–6)
EPI CELLS # UR: ABNORMAL /HPF (ref 0–5)
ESTIMATED AVERAGE GLUCOSE: 103 MG/DL — SIGNIFICANT CHANGE UP (ref 68–114)
GLUCOSE BLDA-MCNC: 99 MG/DL — SIGNIFICANT CHANGE UP (ref 70–99)
GLUCOSE BLDC GLUCOMTR-MCNC: 97 MG/DL — SIGNIFICANT CHANGE UP (ref 70–99)
GLUCOSE SERPL-MCNC: 100 MG/DL — HIGH (ref 70–99)
GLUCOSE UR QL: NEGATIVE — SIGNIFICANT CHANGE UP
HCO3 BLDA-SCNC: 27 MMOL/L — SIGNIFICANT CHANGE UP (ref 21–28)
HCT VFR BLD CALC: 47.9 % — HIGH (ref 34.5–45)
HCT VFR BLDA CALC: 42 % — SIGNIFICANT CHANGE UP
HDLC SERPL-MCNC: 45 MG/DL — LOW
HGB BLD CALC-MCNC: 14 G/DL — SIGNIFICANT CHANGE UP (ref 11.7–16.1)
HGB BLD-MCNC: 15.6 G/DL — HIGH (ref 11.5–15.5)
HGB BLDA-MCNC: 13.9 G/DL — SIGNIFICANT CHANGE UP (ref 11.7–16.1)
HGB BLDA-MCNC: 14.7 G/DL — SIGNIFICANT CHANGE UP (ref 11.7–16.1)
IMM GRANULOCYTES NFR BLD AUTO: 0.3 % — SIGNIFICANT CHANGE UP (ref 0–0.9)
INR BLD: 1.08 — SIGNIFICANT CHANGE UP (ref 0.88–1.16)
ISTAT ACTK (ACTIVATED CLOTTING TIME KAOLIN): 143 SEC — HIGH (ref 74–137)
ISTAT ACTK (ACTIVATED CLOTTING TIME KAOLIN): 317 SEC — HIGH (ref 74–137)
KETONES UR-MCNC: ABNORMAL MG/DL
LEUKOCYTE ESTERASE UR-ACNC: ABNORMAL
LG PLATELETS BLD QL AUTO: SLIGHT — SIGNIFICANT CHANGE UP
LIPID PNL WITH DIRECT LDL SERPL: 148 MG/DL — HIGH
LYMPHOCYTES # BLD AUTO: 1.16 K/UL — SIGNIFICANT CHANGE UP (ref 1–3.3)
LYMPHOCYTES # BLD AUTO: 12.4 % — LOW (ref 13–44)
MANUAL SMEAR VERIFICATION: SIGNIFICANT CHANGE UP
MCHC RBC-ENTMCNC: 30.5 PG — SIGNIFICANT CHANGE UP (ref 27–34)
MCHC RBC-ENTMCNC: 32.6 GM/DL — SIGNIFICANT CHANGE UP (ref 32–36)
MCV RBC AUTO: 93.7 FL — SIGNIFICANT CHANGE UP (ref 80–100)
METHGB MFR BLDA: 0.6 % — SIGNIFICANT CHANGE UP
METHGB MFR BLDA: 0.7 % — SIGNIFICANT CHANGE UP
METHGB MFR BLDV: 0.8 % — SIGNIFICANT CHANGE UP
MONOCYTES # BLD AUTO: 0.48 K/UL — SIGNIFICANT CHANGE UP (ref 0–0.9)
MONOCYTES NFR BLD AUTO: 5.2 % — SIGNIFICANT CHANGE UP (ref 2–14)
NEUTROPHILS # BLD AUTO: 7.59 K/UL — HIGH (ref 1.8–7.4)
NEUTROPHILS NFR BLD AUTO: 81.5 % — HIGH (ref 43–77)
NITRITE UR-MCNC: NEGATIVE — SIGNIFICANT CHANGE UP
NON HDL CHOLESTEROL: 165 MG/DL — HIGH
NRBC # BLD: 0 /100 WBCS — SIGNIFICANT CHANGE UP (ref 0–0)
NT-PROBNP SERPL-SCNC: 2686 PG/ML — HIGH
OXYHGB MFR BLDA: 84.7 % — LOW (ref 90–95)
OXYHGB MFR BLDA: 87.8 % — LOW (ref 90–95)
PCO2 BLDA: 42 MMHG — SIGNIFICANT CHANGE UP (ref 32–45)
PH BLDA: 7.41 — SIGNIFICANT CHANGE UP (ref 7.35–7.45)
PH UR: 7.5 — SIGNIFICANT CHANGE UP (ref 5–8)
PLAT MORPH BLD: NORMAL — SIGNIFICANT CHANGE UP
PLATELET # BLD AUTO: 228 K/UL — SIGNIFICANT CHANGE UP (ref 150–400)
PLATELET COUNT - ESTIMATE: NORMAL — SIGNIFICANT CHANGE UP
PO2 BLDA: 53 MMHG — LOW (ref 83–108)
POTASSIUM BLDA-SCNC: 3.3 MMOL/L — LOW (ref 3.5–5.1)
POTASSIUM SERPL-MCNC: 4.3 MMOL/L — SIGNIFICANT CHANGE UP (ref 3.5–5.3)
POTASSIUM SERPL-SCNC: 4.3 MMOL/L — SIGNIFICANT CHANGE UP (ref 3.5–5.3)
PROT SERPL-MCNC: 6.8 G/DL — SIGNIFICANT CHANGE UP (ref 6.4–8.2)
PROT UR-MCNC: NEGATIVE MG/DL — SIGNIFICANT CHANGE UP
PROTHROM AB SERPL-ACNC: 12.5 SEC — SIGNIFICANT CHANGE UP (ref 10.5–13.4)
RBC # BLD: 5.11 M/UL — SIGNIFICANT CHANGE UP (ref 3.8–5.2)
RBC # FLD: 14.5 % — SIGNIFICANT CHANGE UP (ref 10.3–14.5)
RBC BLD AUTO: NORMAL — SIGNIFICANT CHANGE UP
RBC CASTS # UR COMP ASSIST: < 5 /HPF — SIGNIFICANT CHANGE UP
SAO2 % BLDA: 87.3 % — LOW (ref 94–98)
SAO2 % BLDA: 90.2 % — LOW (ref 94–98)
SAO2 % BLDV: 66 % — LOW (ref 67–88)
SODIUM BLDA-SCNC: 138 MMOL/L — SIGNIFICANT CHANGE UP (ref 136–145)
SODIUM SERPL-SCNC: 143 MMOL/L — SIGNIFICANT CHANGE UP (ref 132–145)
SP GR SPEC: 1.01 — SIGNIFICANT CHANGE UP (ref 1–1.03)
TRIGL SERPL-MCNC: 87 MG/DL — SIGNIFICANT CHANGE UP
TROPONIN I, HIGH SENSITIVITY RESULT: 4622.3 NG/L — HIGH
TROPONIN I, HIGH SENSITIVITY RESULT: 6008.7 NG/L — HIGH
TROPONIN T SERPL-MCNC: 0.88 NG/ML — CRITICAL HIGH (ref 0–0.01)
UROBILINOGEN FLD QL: 0.2 E.U./DL — SIGNIFICANT CHANGE UP
WBC # BLD: 9.32 K/UL — SIGNIFICANT CHANGE UP (ref 3.8–10.5)
WBC # FLD AUTO: 9.32 K/UL — SIGNIFICANT CHANGE UP (ref 3.8–10.5)
WBC UR QL: ABNORMAL /HPF

## 2023-05-23 PROCEDURE — 93010 ELECTROCARDIOGRAM REPORT: CPT

## 2023-05-23 PROCEDURE — 99291 CRITICAL CARE FIRST HOUR: CPT | Mod: FS

## 2023-05-23 PROCEDURE — 71045 X-RAY EXAM CHEST 1 VIEW: CPT | Mod: 26

## 2023-05-23 RX ORDER — SODIUM CHLORIDE 9 MG/ML
1000 INJECTION INTRAMUSCULAR; INTRAVENOUS; SUBCUTANEOUS
Refills: 0 | Status: DISCONTINUED | OUTPATIENT
Start: 2023-05-23 | End: 2023-05-23

## 2023-05-23 RX ORDER — HEPARIN SODIUM 5000 [USP'U]/ML
INJECTION INTRAVENOUS; SUBCUTANEOUS
Qty: 25000 | Refills: 0 | Status: DISCONTINUED | OUTPATIENT
Start: 2023-05-23 | End: 2023-05-23

## 2023-05-23 RX ORDER — PANTOPRAZOLE SODIUM 20 MG/1
40 TABLET, DELAYED RELEASE ORAL
Refills: 0 | Status: DISCONTINUED | OUTPATIENT
Start: 2023-05-23 | End: 2023-05-24

## 2023-05-23 RX ORDER — HEPARIN SODIUM 5000 [USP'U]/ML
4100 INJECTION INTRAVENOUS; SUBCUTANEOUS EVERY 6 HOURS
Refills: 0 | Status: DISCONTINUED | OUTPATIENT
Start: 2023-05-23 | End: 2023-05-23

## 2023-05-23 RX ORDER — ONDANSETRON 8 MG/1
4 TABLET, FILM COATED ORAL ONCE
Refills: 0 | Status: COMPLETED | OUTPATIENT
Start: 2023-05-23 | End: 2023-05-23

## 2023-05-23 RX ORDER — CLOPIDOGREL BISULFATE 75 MG/1
75 TABLET, FILM COATED ORAL DAILY
Refills: 0 | Status: DISCONTINUED | OUTPATIENT
Start: 2023-05-24 | End: 2023-05-24

## 2023-05-23 RX ORDER — AMLODIPINE BESYLATE 2.5 MG/1
2.5 TABLET ORAL DAILY
Refills: 0 | Status: DISCONTINUED | OUTPATIENT
Start: 2023-05-24 | End: 2023-05-24

## 2023-05-23 RX ORDER — CLOPIDOGREL BISULFATE 75 MG/1
600 TABLET, FILM COATED ORAL ONCE
Refills: 0 | Status: COMPLETED | OUTPATIENT
Start: 2023-05-23 | End: 2023-05-23

## 2023-05-23 RX ORDER — ASPIRIN/CALCIUM CARB/MAGNESIUM 324 MG
81 TABLET ORAL DAILY
Refills: 0 | Status: DISCONTINUED | OUTPATIENT
Start: 2023-05-24 | End: 2023-05-24

## 2023-05-23 RX ORDER — ATORVASTATIN CALCIUM 80 MG/1
40 TABLET, FILM COATED ORAL AT BEDTIME
Refills: 0 | Status: DISCONTINUED | OUTPATIENT
Start: 2023-05-23 | End: 2023-05-24

## 2023-05-23 RX ORDER — METOPROLOL TARTRATE 50 MG
25 TABLET ORAL DAILY
Refills: 0 | Status: DISCONTINUED | OUTPATIENT
Start: 2023-05-23 | End: 2023-05-24

## 2023-05-23 RX ORDER — LIDOCAINE 4 G/100G
5 CREAM TOPICAL ONCE
Refills: 0 | Status: COMPLETED | OUTPATIENT
Start: 2023-05-23 | End: 2023-05-23

## 2023-05-23 RX ORDER — HEPARIN SODIUM 5000 [USP'U]/ML
4100 INJECTION INTRAVENOUS; SUBCUTANEOUS ONCE
Refills: 0 | Status: COMPLETED | OUTPATIENT
Start: 2023-05-23 | End: 2023-05-23

## 2023-05-23 RX ADMIN — HEPARIN SODIUM 800 UNIT(S)/HR: 5000 INJECTION INTRAVENOUS; SUBCUTANEOUS at 14:00

## 2023-05-23 RX ADMIN — LIDOCAINE 5 MILLILITER(S): 4 CREAM TOPICAL at 11:35

## 2023-05-23 RX ADMIN — Medication 30 MILLILITER(S): at 11:35

## 2023-05-23 RX ADMIN — SODIUM CHLORIDE 200 MILLILITER(S): 9 INJECTION INTRAMUSCULAR; INTRAVENOUS; SUBCUTANEOUS at 11:34

## 2023-05-23 RX ADMIN — HEPARIN SODIUM 4100 UNIT(S): 5000 INJECTION INTRAVENOUS; SUBCUTANEOUS at 14:06

## 2023-05-23 RX ADMIN — ONDANSETRON 4 MILLIGRAM(S): 8 TABLET, FILM COATED ORAL at 23:39

## 2023-05-23 RX ADMIN — ATORVASTATIN CALCIUM 40 MILLIGRAM(S): 80 TABLET, FILM COATED ORAL at 22:23

## 2023-05-23 RX ADMIN — ONDANSETRON 4 MILLIGRAM(S): 8 TABLET, FILM COATED ORAL at 11:34

## 2023-05-23 RX ADMIN — CLOPIDOGREL BISULFATE 600 MILLIGRAM(S): 75 TABLET, FILM COATED ORAL at 16:19

## 2023-05-23 RX ADMIN — ONDANSETRON 4 MILLIGRAM(S): 8 TABLET, FILM COATED ORAL at 19:14

## 2023-05-23 NOTE — ED PROVIDER NOTE - CLINICAL SUMMARY MEDICAL DECISION MAKING FREE TEXT BOX
pt presents with c/o chest pain, dizziness, nausea, vomiting. reports pressure, vomiting, and dizziness were not present on previous visits this month. will obtain labs, cxr, ekg. ekg with some TWI and labs with elevated trop. repeat trop is trending up. called cardio Dr. Galindo who is requesting admission to cath lab. heparin ordered. already on statin. [facility] and si[facility] transfer.

## 2023-05-23 NOTE — H&P ADULT - HISTORY OF PRESENT ILLNESS
Cards:   Pharm: [facility]  ([phone])   Escort: Wife       81yo English speaking F with PMHX of  forme tobacco use (quit 30 years ago), HLD, HTN, PAD (s/p x1 stent in R LE), and GERD presented to [facility] ED today (5/23/23) c/o indigestion nausea/vomiting associated with pressure like-SSCP w/ +rad to jaw/L arm, SOB and dizziness starting at 2 am.  Patient took her home Pepcid without relief of symptoms. Patient reports indigestion sx with associated CP has been intermittent for the last month but a stronger pressure-like CP sensation along with new onset of dizziness prompted her ED visit today. PT also reports an increase in b/l LE swelling. Patient OSEI, palpitations, LOC, diarrhea, fever/chills/sick contact, diaphoresis, orthopnea/PND.     At [facility]:   -EKG NSR at 83 bpm, with left axis deviation and biphasic T waves in leads III, AVF and TWI in V5, V6.   -Labs: H&H stable, Toponin I (4622.3 @11:13 am and 6008.7 @12:52 pm), coag studies wnl, pro-BNP 2686).   -TX: Heparin gtt ______, Zofran 4 mg IVP,  mg PO, NSS @ _____,       Upon arrival to [facility], VSS (HR: 87, BP: 154/60, SP02: 97% RA, RR 15, T: 99.2F).  In light of patient's risk factors, abnormal EKG and Troponin results, and CCS class IV anginal symptoms, patient is transferred to [facility] for cardiac catheterization w/ possible intervention if clinically indicated.       81yo English speaking F with PMHX of  former tobacco use (quit 30 years ago), HLD, HTN, PAD (s/p x1 stent in R LE), and GERD presented to [facility] ED today (5/23/23) c/o indigestion nausea/vomiting associated with pressure like-SSCP w/ +rad to jaw/L arm, SOB and dizziness starting at 2 am.  Patient took her home Pepcid without relief of symptoms. Patient reports indigestion sx with associated CP has been intermittent for the last month but a stronger pressure-like CP sensation along with new onset of dizziness prompted her ED visit today. PT also reports an increase in b/l LE swelling. Patient OSEI, palpitations, LOC, diarrhea, fever/chills/sick contact, diaphoresis, orthopnea/PND.     At [facility]:   -EKG NSR at 83 bpm, with left axis deviation and biphasic T waves in leads III, AVF and TWI in V5, V6.   -Labs: H&H stable, Toponin I (4622.3 @11:13 am and 6008.7 @12:52 pm), coag studies wnl, pro-BNP 2686).   -TX: Heparin gtt, Zofran 4 mg IVP,  mg PO (via EMS), NS 200cc/hr continuously during transfer.    Patient transferred to [facility] for further treatment of NSTEMI with plan for [facility] w/ possible PCI.

## 2023-05-23 NOTE — H&P ADULT - NSHPLABSRESULTS_GEN_ALL_CORE
15.6   9.32  )-----------( 228      ( 23 May 2023 11:13 )             47.9           143  |  107  |  14  ----------------------------<  100<H>  4.3   |  33<H>  |  0.73    Ca    9.0      23 May 2023 11:13    TPro  6.8  /  Alb  3.4  /  TBili  1.2  /  DBili  x   /  AST  64<H>  /  ALT  28  /  AlkPhos  134<H>        PT/INR - ( 23 May 2023 11:13 )   PT: 12.5 sec;   INR: 1.08          PTT - ( 23 May 2023 11:13 )  PTT:34.0 sec          Urinalysis Basic - ( 23 May 2023 12:48 )    Color: Yellow / Appearance: Clear / S.015 / pH: x  Gluc: x / Ketone: Trace mg/dL  / Bili: NEGATIVE / Urobili: 0.2 E.U./dL   Blood: x / Protein: NEGATIVE mg/dL / Nitrite: NEGATIVE   Leuk Esterase: Small / RBC: < 5 /HPF / WBC 5-10 /HPF   Sq Epi: x / Non Sq Epi: x / Bacteria: Present /HPF        EKG: NSR at 98  bpm, with left axis deviation and biphasic T waves in leads III, AVF and TWI in V5, V6.

## 2023-05-23 NOTE — PATIENT PROFILE ADULT - FALL HARM RISK - HARM RISK INTERVENTIONS
Assistance with ambulation/Assistance OOB with selected safe patient handling equipment/Communicate Risk of Fall with Harm to all staff/Discuss with provider need for PT consult/Monitor gait and stability/Provide patient with walking aids - walker, cane, crutches/Reinforce activity limits and safety measures with patient and family/Sit up slowly, dangle for a short time, stand at bedside before walking/Tailored Fall Risk Interventions/Use of alarms - bed, chair and/or voice tab/Visual Cue: Yellow wristband and red socks/Bed in lowest position, wheels locked, appropriate side rails in place/Call bell, personal items and telephone in reach/Instruct patient to call for assistance before getting out of bed or chair/Non-slip footwear when patient is out of bed/[place] to call system/Physically safe environment - no spills, clutter or unnecessary equipment/Purposeful Proactive Rounding/Room/bathroom lighting operational, light cord in reach

## 2023-05-23 NOTE — ED ADULT TRIAGE NOTE - AS HEIGHT TYPE
BP now 121/60.   Medications again reviewed and pt is currently taking:    Carvedilol 25mg BID, AM and Dinner  Doxazosin 1mg BID, AM/HS  Nifedipine 60mg in AM  Lisinopril 10mg at HS  Imdur 30mg at HS    Hydralazine at lunch prn for SBP greater than 150 (does not usually need to take).     Pt continues to deny hypertensive symptoms.     Again reviewed things that can affect BP - position, fluid status, stress etc.     Routed to Dr Newby  Please advise if any changes recommended at this time.   stated

## 2023-05-23 NOTE — ED PROVIDER NOTE - SKIN, MLM
Skin normal color for race, warm, dry and intact. No evidence of rash. 1+ pitting edema bilat lower extremities

## 2023-05-23 NOTE — ED ADULT NURSE NOTE - NSFALLUNIVINTERV_ED_ALL_ED
Bed/Stretcher in lowest position, wheels locked, appropriate side rails in place/Call bell, personal items and telephone in reach/Instruct patient to call for assistance before getting out of bed/chair/stretcher/Non-slip footwear applied when patient is off stretcher/[place] to call system/Physically safe environment - no spills, clutter or unnecessary equipment/Purposeful proactive rounding/Room/bathroom lighting operational, light cord in reach

## 2023-05-23 NOTE — ED ADULT NURSE NOTE - OBJECTIVE STATEMENT
c/o chest pain and dizziness x 2-3 days, no s/s of distress, awaiting provider eval, will continue to monitor for change in assessment

## 2023-05-23 NOTE — H&P ADULT - PROBLEM SELECTOR PLAN 1
- Presented w/ anginal symptoms.   - EKG w/ TWI II/III/aVF, V5/V6; Trop I 4000 --> 6000.   - s/p ASA and Plavix load; Heparin gtt initiated at [facility].     - CONT: ASA 81mg PO QD, Plavix 75mg PO QD, Atorvastatin 40mg PO QD. - Presented w/ anginal symptoms.   - EKG w/ TWI II/III/aVF, V5/V6; Trop I 4000 --> 6000.   - s/p ASA and Plavix load; Heparin gtt initiated at [facility].   - s/p Cardiac cath (5/23/23): JUANI mRCA; LM normal, pLAD 60%, mLAD 60%, D1 mild disease; ACCESS RCFA w/ Perclose.   - s/p RHC (5/23/23): RA 8, RV 47/8, PA 43/18 (30), PCWP 23, PASat 66%, CO 4.25, CI 2.6; ACCESS: RCFV 7Fr due @ 8:00p.   - s/p Pulmonary angiogram intraprocedure (-) for PE in pulm arteries.   - CONT: ASA 81mg PO QD, Plavix 75mg PO QD, Atorvastatin 40mg PO QD.  - PLAN: s/p cath w/ PCI; Plan for staged of pLCx residual in 5wks; TTE for structural eval, monitor for need for further lasix.

## 2023-05-23 NOTE — H&P ADULT - PROBLEM SELECTOR PLAN 3
- F/U Lipid profile.   - CONT: Atorvastatin 40mg PO QHS. - F/U Lipid profile.   - CONT: Atorvastatin 40mg PO QHS.      DVT ppx: holding periprocedure  Dispo: likely discharge tomorrow pending clinically stable and TTE results

## 2023-05-23 NOTE — H&P ADULT - RESPIRATORY
normal/clear to auscultation bilaterally/no wheezes/no rales/no rhonchi/no respiratory distress/airway patent/breath sounds equal/good air movement

## 2023-05-23 NOTE — ED ADULT NURSE REASSESSMENT NOTE - NSFALLUNIVINTERV_ED_ALL_ED
Bed/Stretcher in lowest position, wheels locked, appropriate side rails in place/Call bell, personal items and telephone in reach/Instruct patient to call for assistance before getting out of bed/chair/stretcher/Non-slip footwear applied when patient is off stretcher/[place] to call system/Physically safe environment - no spills, clutter or unnecessary equipment/Purposeful proactive rounding/Room/bathroom lighting operational, light cord in reach

## 2023-05-23 NOTE — H&P ADULT - ASSESSMENT
83yo English speaking F with PMHX of  former tobacco use (quit 30 years ago), HLD, HTN, PAD (s/p x1 stent in R LE), and GERD presented to [facility] ED today (5/23/23) c/o indigestion nausea/vomiting associated with pressure like-SSCP w/ +rad to jaw/L arm, SOB and dizziness starting at 2 am. EKG and elevated troponins revealed PT having NSTEMI. PT given NSS, Heparin gtt, Zofran and 324 mg ASA prior to [facility] arrival. In light of patient's risk factors, abnormal EKG and Troponin results, and CCS class IV anginal symptoms, patient is transferred to [facility] for cardiac catheterization w/ possible intervention if clinically indicated.        At [facility]:   -EKG NSR at 83 bpm, with left axis deviation and biphasic T waves in leads III, AVF and TWI in V5, V6.   -Labs: H&H stable, Toponin I (4622.3 @11:13 am and 6008.7 @12:52 pm), coag studies wnl, pro-BNP 2686).   -TX: Heparin gtt ______, Zofran 4 mg IVP,  mg PO, NSS @ _____,       Upon arrival to [facility], VSS (HR: 87, BP: 154/60, SP02: 97% RA, RR 15, T: 99.2F).     81yo F w/ PMHx of HTN, HLD, PAD (s/p stent RLE), GERD [facility] w/ GI Sx of N/V and also CP (pressure w/ radiation ot Jaw/L arm), SOB, and dizziness. Found to have Trop I (+) and uptrending. Ruled in NSTEMI and transferred to [facility] for cardiac catheterization with possible PCI.     - ASA II; Mallampati II.   - VVS.   - Pt is a candidate for moderate sedation.   - LOAD: s/p ASA 324mg PO per EMS; ordered Plavix 600mg PO x1.   - FLUIDS: pt has been receivign NS 200cc/hr continuously during transfer.     Risks & benefits of procedure and alternative therapy have been explained to the patient including but not limited to: allergic reaction, bleeding w/possible need for blood transfusion, infection, renal and vascular compromise, limb damage, arrhythmia, stroke, vessel dissection/perforation, Myocardial infarction, emergent CABG. Informed consent obtained and in chart.

## 2023-05-23 NOTE — ED PROVIDER NOTE - OBJECTIVE STATEMENT
83yo F with h/o HTN on amlodipine and HLD on 40mg atorvastatin presents with c/o GERD pain which woke her up at 2am. she took pepcid without relief and then at 6am took prilosec also without relief. reports associated chest pressure with nausea, vomiting and dizziness. she reports the chest some chest pain with her GERD for the last month but reports more pressure today which is different. also reports the nausea and vomiting which only occasionally comes with her reflux but is more severe today. the dizziness is new. she also reports some mild lower extremity swelling but states this is normal for her. pt also told PCT she was having some jaw and left arm pain. denies ha, vision changes, numbness, weakness, back pain. no anticoagulation. normal BM this morning.

## 2023-05-23 NOTE — ED PROVIDER NOTE - CRITICAL CARE ATTENDING CONTRIBUTION TO CARE
82-year-old female with history of hypertension and hyperlipidemia, GERD, complaints of chest pressure with nausea vomiting and dizziness.  Labs noted with significantly elevated troponin consistent with NSTEMI.  Accepted for transfer to Cath Lab for PCI.    The patient was seen immediately upon arrival due to a high probability of imminent or life-threatening deterioration secondary to NSTEMI which required my direct attention, intervention, and personal management at the bedside. I have personally provided critical care time exclusive of time spent on separately billable procedures. Time includes review of laboratory data, radiology results, discussion with consultants, discussion with the patient's family, and monitoring for potential decompensation.

## 2023-05-23 NOTE — PATIENT PROFILE ADULT - NSPROGENSOURCEINFO_GEN_A_NUR
Advised patient.  
I would hold off until 7 week US unless pain>7. It may well be a cyst as you have a cyst in the early part of pregnancy where you ovulated. We wouldn't do anything about it unless pain is severe and US now won't tell us how baby is doing. Use tylenol regularly up to 1438-0830 mg in a 24 hour period, heat to abdomen, stay regular with bowel function and take Miralax if needed.   
Patient of Dr. Mina left message stating she is calling in regards to ovarian pain.   
Patient states on CT in November there was a hemorrhagic involuting cyst found on her right ovary.  She is concerned because she woke up last night with a shooting pain near her right ovary.  The pain is now coming and going but is less painful.  She is 5 weeks pregnant and has an ultrasound and doctor appointment 3/19/20.  She states the pain is intermittent and at worse a \"6\"  The pain is more there when she stands.  She is not having any bleeding or abnormal discharge.    Last bowel movement was today with no constipation and she is having no urinary issues.  Patient hasn't had any lab work or ultrasound with this pregnancy.  Will discuss with Dr. Mina and call patient with recommendations.        
patient

## 2023-05-24 ENCOUNTER — TRANSCRIPTION ENCOUNTER (OUTPATIENT)
Age: 83
End: 2023-05-24

## 2023-05-24 VITALS
SYSTOLIC BLOOD PRESSURE: 123 MMHG | HEART RATE: 70 BPM | OXYGEN SATURATION: 97 % | DIASTOLIC BLOOD PRESSURE: 65 MMHG | RESPIRATION RATE: 16 BRPM

## 2023-05-24 DIAGNOSIS — Z87.19 PERSONAL HISTORY OF OTHER DISEASES OF THE DIGESTIVE SYSTEM: ICD-10-CM

## 2023-05-24 DIAGNOSIS — R35.0 FREQUENCY OF MICTURITION: ICD-10-CM

## 2023-05-24 DIAGNOSIS — I10 ESSENTIAL (PRIMARY) HYPERTENSION: ICD-10-CM

## 2023-05-24 DIAGNOSIS — Z86.79 PERSONAL HISTORY OF OTHER DISEASES OF THE CIRCULATORY SYSTEM: ICD-10-CM

## 2023-05-24 DIAGNOSIS — Z87.440 PERSONAL HISTORY OF URINARY (TRACT) INFECTIONS: ICD-10-CM

## 2023-05-24 DIAGNOSIS — K21.9 GASTRO-ESOPHAGEAL REFLUX DISEASE WITHOUT ESOPHAGITIS: ICD-10-CM

## 2023-05-24 DIAGNOSIS — Z88.2 ALLERGY STATUS TO SULFONAMIDES: ICD-10-CM

## 2023-05-24 DIAGNOSIS — Z88.0 ALLERGY STATUS TO PENICILLIN: ICD-10-CM

## 2023-05-24 DIAGNOSIS — Z79.82 LONG TERM (CURRENT) USE OF ASPIRIN: ICD-10-CM

## 2023-05-24 DIAGNOSIS — Z87.09 PERSONAL HISTORY OF OTHER DISEASES OF THE RESPIRATORY SYSTEM: ICD-10-CM

## 2023-05-24 DIAGNOSIS — Z87.442 PERSONAL HISTORY OF URINARY CALCULI: ICD-10-CM

## 2023-05-24 LAB
ANION GAP SERPL CALC-SCNC: 14 MMOL/L — SIGNIFICANT CHANGE UP (ref 5–17)
BASOPHILS # BLD AUTO: 0.04 K/UL — SIGNIFICANT CHANGE UP (ref 0–0.2)
BASOPHILS NFR BLD AUTO: 0.3 % — SIGNIFICANT CHANGE UP (ref 0–2)
BUN SERPL-MCNC: 12 MG/DL — SIGNIFICANT CHANGE UP (ref 7–23)
CALCIUM SERPL-MCNC: 8.4 MG/DL — SIGNIFICANT CHANGE UP (ref 8.4–10.5)
CHLORIDE SERPL-SCNC: 104 MMOL/L — SIGNIFICANT CHANGE UP (ref 96–108)
CO2 SERPL-SCNC: 25 MMOL/L — SIGNIFICANT CHANGE UP (ref 22–31)
CREAT SERPL-MCNC: 0.73 MG/DL — SIGNIFICANT CHANGE UP (ref 0.5–1.3)
EGFR: 81 ML/MIN/1.73M2 — SIGNIFICANT CHANGE UP
EOSINOPHIL # BLD AUTO: 0 K/UL — SIGNIFICANT CHANGE UP (ref 0–0.5)
EOSINOPHIL NFR BLD AUTO: 0 % — SIGNIFICANT CHANGE UP (ref 0–6)
GLUCOSE SERPL-MCNC: 95 MG/DL — SIGNIFICANT CHANGE UP (ref 70–99)
HCT VFR BLD CALC: 45.5 % — HIGH (ref 34.5–45)
HGB BLD-MCNC: 14.8 G/DL — SIGNIFICANT CHANGE UP (ref 11.5–15.5)
IMM GRANULOCYTES NFR BLD AUTO: 0.2 % — SIGNIFICANT CHANGE UP (ref 0–0.9)
LYMPHOCYTES # BLD AUTO: 1.61 K/UL — SIGNIFICANT CHANGE UP (ref 1–3.3)
LYMPHOCYTES # BLD AUTO: 13.3 % — SIGNIFICANT CHANGE UP (ref 13–44)
MAGNESIUM SERPL-MCNC: 2.2 MG/DL — SIGNIFICANT CHANGE UP (ref 1.6–2.6)
MCHC RBC-ENTMCNC: 30.7 PG — SIGNIFICANT CHANGE UP (ref 27–34)
MCHC RBC-ENTMCNC: 32.5 GM/DL — SIGNIFICANT CHANGE UP (ref 32–36)
MCV RBC AUTO: 94.4 FL — SIGNIFICANT CHANGE UP (ref 80–100)
MONOCYTES # BLD AUTO: 1.31 K/UL — HIGH (ref 0–0.9)
MONOCYTES NFR BLD AUTO: 10.9 % — SIGNIFICANT CHANGE UP (ref 2–14)
NEUTROPHILS # BLD AUTO: 9.07 K/UL — HIGH (ref 1.8–7.4)
NEUTROPHILS NFR BLD AUTO: 75.3 % — SIGNIFICANT CHANGE UP (ref 43–77)
NRBC # BLD: 0 /100 WBCS — SIGNIFICANT CHANGE UP (ref 0–0)
PLATELET # BLD AUTO: 218 K/UL — SIGNIFICANT CHANGE UP (ref 150–400)
POTASSIUM SERPL-MCNC: 3.8 MMOL/L — SIGNIFICANT CHANGE UP (ref 3.5–5.3)
POTASSIUM SERPL-SCNC: 3.8 MMOL/L — SIGNIFICANT CHANGE UP (ref 3.5–5.3)
RBC # BLD: 4.82 M/UL — SIGNIFICANT CHANGE UP (ref 3.8–5.2)
RBC # FLD: 14.9 % — HIGH (ref 10.3–14.5)
SODIUM SERPL-SCNC: 143 MMOL/L — SIGNIFICANT CHANGE UP (ref 135–145)
WBC # BLD: 12.06 K/UL — HIGH (ref 3.8–10.5)
WBC # FLD AUTO: 12.06 K/UL — HIGH (ref 3.8–10.5)

## 2023-05-24 PROCEDURE — 93306 TTE W/DOPPLER COMPLETE: CPT | Mod: 26

## 2023-05-24 PROCEDURE — 99239 HOSP IP/OBS DSCHRG MGMT >30: CPT

## 2023-05-24 RX ORDER — ASPIRIN/CALCIUM CARB/MAGNESIUM 324 MG
1 TABLET ORAL
Qty: 30 | Refills: 11
Start: 2023-05-24 | End: 2024-05-17

## 2023-05-24 RX ORDER — CLOPIDOGREL BISULFATE 75 MG/1
1 TABLET, FILM COATED ORAL
Qty: 30 | Refills: 11
Start: 2023-05-24 | End: 2024-05-17

## 2023-05-24 RX ORDER — METOPROLOL TARTRATE 50 MG
1 TABLET ORAL
Qty: 30 | Refills: 0
Start: 2023-05-24 | End: 2023-06-22

## 2023-05-24 RX ORDER — ATORVASTATIN CALCIUM 80 MG/1
1 TABLET, FILM COATED ORAL
Qty: 30 | Refills: 2
Start: 2023-05-24 | End: 2023-08-21

## 2023-05-24 RX ORDER — POTASSIUM CHLORIDE 20 MEQ
20 PACKET (EA) ORAL ONCE
Refills: 0 | Status: COMPLETED | OUTPATIENT
Start: 2023-05-24 | End: 2023-05-24

## 2023-05-24 RX ORDER — ATORVASTATIN CALCIUM 80 MG/1
80 TABLET, FILM COATED ORAL AT BEDTIME
Refills: 0 | Status: DISCONTINUED | OUTPATIENT
Start: 2023-05-24 | End: 2023-05-24

## 2023-05-24 RX ADMIN — Medication 20 MILLIEQUIVALENT(S): at 13:00

## 2023-05-24 RX ADMIN — Medication 81 MILLIGRAM(S): at 11:19

## 2023-05-24 RX ADMIN — CLOPIDOGREL BISULFATE 75 MILLIGRAM(S): 75 TABLET, FILM COATED ORAL at 11:20

## 2023-05-24 RX ADMIN — PANTOPRAZOLE SODIUM 40 MILLIGRAM(S): 20 TABLET, DELAYED RELEASE ORAL at 07:07

## 2023-05-24 RX ADMIN — AMLODIPINE BESYLATE 2.5 MILLIGRAM(S): 2.5 TABLET ORAL at 05:46

## 2023-05-24 NOTE — DIETITIAN INITIAL EVALUATION ADULT - OTHER INFO
81yo F with PMHX HLD, HTN, PAD (s/p x1 stent in R LE), and GERD presented to [facility] ED 5/23/23 c/o indigestion nausea/vomiting associated with pressure like-SSCP w/ +rad to jaw/L arm, SOB and dizziness starting at 2 am. Pt reports indigestion sx with associated CP has been intermittent for the last month but a stronger pressure-like CP sensation along with new onset of dizziness prompted her ED visit. Pt transferred to [facility] for further treatment of NSTEMI with plan for LHC/ possible PCI.     Pt seen this AM on 5UR. Pt ordered for DASH TLC diet. Pending first meal this AM however reports limited desire to eat d/t reports of nausea. Denies vomiting at this time. Pt only wanting to eat crackers this AM. Protonix is ordered. PTA reports nausea and vomiting x 1-2 weeks. Had only been eating broth, jello and crackers PTA. Assume meeting<75% EER PTA. Pt however denies wt changes;  pounds, admit wt 149 pounds suggesting overall wt stable. No overt s/s of wasting/loss Observed. Unable to DX malnutrition at this time. CHOL 210, HDL 45, NON , . No pain. 1+BL Arm Edema, No pressure ulcers. BM+5/23.   Please see below for nutritions recommendations.

## 2023-05-24 NOTE — DISCHARGE NOTE PROVIDER - HOSPITAL COURSE
83 y/o English speaking F with PMHX of former tobacco use (quit 30 years ago), HLD, HTN, PAD (s/p x1 stent in R LE), and GERD presented to [facility] ED (5/23/23) c/o indigestion nausea/vomiting associated with pressure like-SSCP w/ +rad to jaw/L arm, SOB and dizzinessx 1 day. Patient took her home Pepcid without relief of symptoms. Patient reports indigestion sx with associated CP has been intermittent for the last month but a stronger pressure-like CP sensation along with new onset of dizziness prompted her ED. PT also reports an increase in b/l LE swelling. EKG NSR at 83 bpm, with left axis deviation and biphasic T waves in leads III, AVF and TWI in V5, V6.   -Labs: H&H stable, Toponin I (4622.3 ->6008.7, pro-BNP 2686.   -TX: Heparin gtt, Zofran 4 mg IVP,  mg PO (via EMS), NS 200cc/hr continuously during transfer.    Patient transferred to [facility] for further treatment of NSTEMI with plan for [facility] w/ possible PCI.   Cardiac cath (5/23/23): JUANI mRCA; LM normal, pLAD 60%, mLAD 60%, mLCx 80% D1 mild disease; LVEF 40%, LVEDP 21mmHg.     - s/p RHC (5/23/23): RA 8, RV 47/8, PA 43/18 (30), PCWP 23, PASat 66%, CO 4.25, CI 2.6; ACCESS: RCFV 7Fr due @ 8:00p.     - s/p Pulmonary angiogram intraprocedure (-) for PE in pulm arteries.     ACCESS: RCFA PC; RCFV 7Fr sheath due at 8:15pm  IVF: none given LVEF 40% and LVEDP 21mmHg; s/p lasix 20mg IV x1 in procedure    INTERVENTIONALIST: Dr. Hare (F: Shashank)  DAPT: ASA/Plavix  STATIN: Atorvastatin 40mg PO qHS    PLAN: return for staged PCI of LCx in 5wks 83 y/o English speaking F with PMHX of former tobacco use (quit 30 years ago), HLD, HTN, PAD (s/p x1 stent in R LE), and GERD presented to [facility] ED (5/23/23) c/o indigestion nausea/vomiting associated with pressure like-SSCP w/ +rad to jaw/L arm, SOB and dizzinessx 1 day. Patient took her home Pepcid without relief of symptoms. Patient reports indigestion sx with associated CP has been intermittent for the last month but a stronger pressure-like CP sensation along with new onset of dizziness prompted her ED. PT also reports an increase in b/l LE swelling. EKG NSR at 83 bpm, with left axis deviation and biphasic T waves in leads III, AVF and TWI in V5, V6.   -Labs: H&H stable, Toponin I (4622.3 ->6008.7, pro-BNP 2686. S/p Heparin gtt, Zofran 4 mg IVP,  mg PO (via EMS), NS 200cc/hr continuously during transfer. Patient transferred to [facility] for further treatment of NSTEMI. Pt underwent Cardiac cath (5/23/23) w/ Dr Hare: JUANI mRCA; LM normal, pLAD 60%, mLAD 60%, mLCx 80% D1 mild disease; LVEF 40%, LVEDP 21mmHg. S/p RHC (5/23/23): RA 8, RV 47/8, PA 43/18 (30), PCWP 23, PASat 66%, CO 4.25, CI 2.6. Underwent Pulmonary angiogram intraprocedure given hypoxia: negative for PE in pulm arteries. ACCESS: R CFA PC; RCFV 7Fr sheath removed.   IVF: none given LVEF 40% and LVEDP 21mmHg; s/p lasix 20mg IV x1 in procedure    INTERVENTIONALIST: Dr. Hare (F: Shashank)  DAPT: ASA/Plavix  STATIN: Atorvastatin 40mg PO qHS    PLAN: return for staged PCI of LCx in 5wks 83 y/o English speaking F with PMHX of former tobacco use (quit 30 years ago), HLD, HTN, PAD (s/p x1 stent in R LE), and GERD presented to [facility] ED (5/23/23) c/o indigestion nausea/vomiting associated with pressure like-SSCP w/ +rad to jaw/L arm, SOB and dizzinessx 1 day. Patient took her home Pepcid without relief of symptoms. Patient reports indigestion sx with associated CP has been intermittent for the last month but a stronger pressure-like CP sensation along with new onset of dizziness prompted her ED. PT also reports an increase in b/l LE swelling. EKG NSR at 83 bpm, with left axis deviation and biphasic T waves in leads III, AVF and TWI in V5, V6.   -Labs: H&H stable, Toponin I (4622.3 ->6008.7, pro-BNP 2686. S/p Heparin gtt, Zofran 4 mg IVP,  mg PO (via EMS), NS 200cc/hr continuously during transfer. Patient transferred to [facility] for further treatment of NSTEMI. Pt underwent Cardiac cath (5/23/23) w/ Dr Hare: JUANI mRCA; LM normal, pLAD 60%, mLAD 60%, mLCx 80% D1 mild disease; LVEF 40%, LVEDP 21mmHg. S/p RHC (5/23/23): RA 8, RV 47/8, PA 43/18 (30), PCWP 23, PASat 66%, CO 4.25, CI 2.6. Underwent Pulmonary angiogram intraprocedure given hypoxia: negative for PE in pulm arteries. ACCESS: R CFA PC; R CFV 7Fr sheath removed - sites CDI w/o hematoma/oozing. S/p lasix 20mg IV x1 in procedure. Pt w/ c/w ASA/Plavix, Atorvastatin 80mg PO qHS. . Plan to return for staged PCI of LCx in 5wks. Echo w/ EF 55-60%. mild conc LVH, grade 2 diastolic dysfunction, mildly dilated LA, mild-mod MR. Hypoxia improved w/ use of incentive spirometer, SpO2 88% -> 95% RA. Pt reports recent Abx treatment for PNA w/ ?Levaquin ~2wks ago.    On the day of discharge, the patient was seen and examined. Symptoms improved. Vital signs are stable. Labs and imaging reviewed. Pt is to follow up with cardiologist Dr Mcgrath in 1-2 weeks for post discharge check-up. Pt instructed to return to ED/seek immediate medical attention if symptoms of chest pain, SOB, LOC, bleeding from the access site. Pt agrees with the discharge plan, verbalizes understanding of the information given. All new medications explained risks/side effects and e-prescribed to patient preferred pharmacy. Not referred for Cardiac Rehab 2/2 pending stage PCI.

## 2023-05-24 NOTE — DIETITIAN INITIAL EVALUATION ADULT - PERTINENT MEDS FT
MEDICATIONS  (STANDING):  amLODIPine   Tablet 2.5 milliGRAM(s) Oral daily  aspirin enteric coated 81 milliGRAM(s) Oral daily  atorvastatin 80 milliGRAM(s) Oral at bedtime  clopidogrel Tablet 75 milliGRAM(s) Oral daily  metoprolol succinate ER 25 milliGRAM(s) Oral daily  pantoprazole    Tablet 40 milliGRAM(s) Oral before breakfast  potassium chloride    Tablet ER 20 milliEquivalent(s) Oral once    MEDICATIONS  (PRN):

## 2023-05-24 NOTE — DISCHARGE NOTE NURSING/CASE MANAGEMENT/SOCIAL WORK - NSDCPEFALRISK_GEN_ALL_CORE
For information on Fall & Injury Prevention, visit: [email or web address] OR  [email or web address] OR  https://www.cdc.gov/steadi/patient.html

## 2023-05-24 NOTE — DISCHARGE NOTE PROVIDER - CARE PROVIDER_API CALL
Matteo Mcgrath  Cardiovascular Disease  [address], [address]rd [place] [address]  Phone: [phone]  Fax: [phone]  Follow Up Time: 2 weeks   Matteo Mcgrath  Cardiovascular Disease  [address], [address]rd [place] [address]  Phone: [phone]  Fax: [phone]  Scheduled Appointment: 06/06/2023 02:15 PM

## 2023-05-24 NOTE — GOALS OF CARE CONVERSATION - ADVANCED CARE PLANNING - CONVERSATION DETAILS
Discussed w/ patients her diagnosis and ongoing treatment s/p PCI 5/23/23. Pt will return for staged PCI in 5 weeks of LCx for residual disease. Wishes to remain full code.

## 2023-05-24 NOTE — DIETITIAN INITIAL EVALUATION ADULT - PROBLEM SELECTOR PLAN 3
- F/U Lipid profile.   - CONT: Atorvastatin 40mg PO QHS.      DVT ppx: holding periprocedure  Dispo: likely discharge tomorrow pending clinically stable and TTE results

## 2023-05-24 NOTE — DISCHARGE NOTE PROVIDER - NSDCMRMEDTOKEN_GEN_ALL_CORE_FT
amLODIPine 2.5 mg oral tablet: 1 tab(s) orally once a day  Lipitor 40 mg oral tablet: 1 tab(s) orally once a day (at bedtime)  PriLOSEC OTC 20 mg oral delayed release tablet: 1 tab(s) orally once a day   amLODIPine 2.5 mg oral tablet: 1 tab(s) orally once a day  aspirin 81 mg oral delayed release tablet: 1 tab(s) orally once a day  atorvastatin 80 mg oral tablet: 1 tab(s) orally once a day (at bedtime)  clopidogrel 75 mg oral tablet: 1 tab(s) orally once a day  metoprolol succinate 25 mg oral tablet, extended release: 1 tab(s) orally once a day  PriLOSEC OTC 20 mg oral delayed release tablet: 1 tab(s) orally once a day

## 2023-05-24 NOTE — DISCHARGE NOTE PROVIDER - NSDCFUSCHEDAPPT_GEN_ALL_CORE_FT
Matteo Mcgrath  [facility] Physician [facility]  [facility] [address]  Scheduled Appointment: 06/06/2023

## 2023-05-24 NOTE — DISCHARGE NOTE PROVIDER - NSDCFUADDINST_GEN_ALL_CORE_FT
- Do NOT drive or operate hazardous machinery for 24 hours. Limit your physical activity for 24-48 hours. Do NOT engage in sports, heavy work or heavy lifting more than 5 lbs for 5 days.   - You MAY shower and wash the area gently with soap and water. BUT no TUB BATHS, HOT TUBS OR SWIMMING FOR 5 DAYS.  Do not keep the area covered. Do not put any lotions, creams, or ointments on the site.  - Your procedure was done through your right groin. If you observe flank bleeding from the puncture site, it is an emergency. Please put direct pressure on the site and go directly to the ER. Bleeding under the skin may also occur and a small "black and blue" may be expected. If the area appears to be expanding or swelling around the puncture site, apply manual compression and go immediately to the nearest ER. If your leg/foot becomes cool or blue and/or you are unable to move it, this must be treated as an emergency, go directly to the nearest ER. Look for signs of infection in the groin: fever, red streaking of the lrg, obvious pus formation and pain.  -If you have any issues or concerns regarding your access site, you may call [facility] Interventional Cardiology at [phone].

## 2023-05-24 NOTE — DISCHARGE NOTE PROVIDER - PROVIDER TOKENS
PROVIDER:[TOKEN:[[id number]],FOLLOWUP:[2 weeks]] PROVIDER:[TOKEN:[[id number]],SCHEDULEDAPPT:[06/06/2023],SCHEDULEDAPPTTIME:[02:15 PM]]

## 2023-05-24 NOTE — DIETITIAN INITIAL EVALUATION ADULT - PROBLEM SELECTOR PLAN 1
- Presented w/ anginal symptoms.   - EKG w/ TWI II/III/aVF, V5/V6; Trop I 4000 --> 6000.   - s/p ASA and Plavix load; Heparin gtt initiated at [facility].   - s/p Cardiac cath (5/23/23): JUANI mRCA; LM normal, pLAD 60%, mLAD 60%, D1 mild disease; ACCESS RCFA w/ Perclose.   - s/p RHC (5/23/23): RA 8, RV 47/8, PA 43/18 (30), PCWP 23, PASat 66%, CO 4.25, CI 2.6; ACCESS: RCFV 7Fr due @ 8:00p.   - s/p Pulmonary angiogram intraprocedure (-) for PE in pulm arteries.   - CONT: ASA 81mg PO QD, Plavix 75mg PO QD, Atorvastatin 40mg PO QD.  - PLAN: s/p cath w/ PCI; Plan for staged of pLCx residual in 5wks; TTE for structural eval, monitor for need for further lasix.

## 2023-05-24 NOTE — DISCHARGE NOTE PROVIDER - DID THE PATIENT PRESENT WITH OR WAS TREATED FOR MALNUTRITION DURING THIS ADMISSION
Please bring in a copy of your advance directive at your next visit, or drop off a copy at any [facility] facility so that it can be added to your medical record.     Thank you for choosing Dr. Villalpando at [facility][note 3]. It's a pleasure to be a part of your healthcare team. Please let us know if you need anything---[phone], press [phone] for the nursing staff. Have a great day!     You may receive a short survey from [facility] about this visit. We appreciate your feedback.    Your Healthcare Providers,  HOMERO Holcomb,  HOMERO Middleton and CORDELL Mauro    Patient Education     Prediabetes  You have been diagnosed with prediabetes. This means that the level of sugar (glucose) in your blood is too high. If you have prediabetes, you are at risk for developing type 2 diabetes. Type 2 diabetes is diagnosed when the level of glucose in the blood reaches a certain high level. With prediabetes, it hasn’t reached this point yet. But it's higher than normal. It is vital to make lifestyle changes to lower your blood sugar, improve your health, and prevent diabetes.       Why worry about prediabetes?  Prediabetes is a condition where the body’s cells have trouble using glucose in the blood for energy. As a result, too much glucose stays in the blood. This can affect how your heart and blood vessels work. Without changes in diet and lifestyle, the problem can get worse. Once you have type 2 diabetes, it's ongoing (chronic). It needs to be managed for the rest of your life. Diabetes can harm the body and your health by damaging organs, such as your eyes and kidneys. It makes you more likely to have heart disease. And it can damage nerves and blood vessels.   Who is a risk for prediabetes?  The exact cause of prediabetes is not clear. But certain risk factors make a person more likely to have it. These include:   · A family history of type 2 diabetes  · Being overweight  · Being older than age 45  · Have high blood pressure  or high cholesterol   · Having had gestational diabetes  · Not being physically active  · Being ,  American, , , , or   Diagnosing prediabetes  Prediabetes may have no symptoms. Or you may have some of the symptoms of diabetes (see below). The diagnosis is made with a blood test. You may have 1 or more of these blood tests:    · Fasting glucose test. Blood is taken and tested after you have fasted (not eaten) for at least 8 hours. A normal test result is 99 milligrams per deciliter (mg/dL) or lower. Prediabetes is 100 mg/dL to 125 mg/dL. Diabetes is 126 mg/dL or higher.  · Glucose tolerance test. Your blood sugar is measured before and after you drink a very sugary liquid. A normal test result is 139 milligrams per deciliter (mg/dL) or lower. Prediabetes is 140 mg/dL to 199 mg/dL. Diabetes is 200 mg/dL or higher.  · Hemoglobin A1c (HbA1c). Your HbA1c is normal if it is below 5.7%. Prediabetes is 5.7% to 6.4%. Diabetes is 6.5% or higher.   Treating prediabetes  The best way to treat prediabetes is to lose at least 5% to 7% of your current weight and be more physically active by getting at least 150 minutes a week of physical activity (at least 30 minutes daily.) When sitting for long periods of time, get up for short sessions of light activity every 30 minutes. These changes help the body’s cells use blood sugar better. Even a small amount of weight loss can help. Work with your healthcare provider to make a plan to eat well and be more active. Keep in mind that small changes can add up. Other changes in your lifestyle (or even taking certain medicines, such as metformin) may make you less likely to develop diabetes. Your provider can talk with you about these. Stopping smoking will decrease your risk of developing diabetes. Don't use e-cigarettes or vaping products. But you may gain some weight if you are not careful.   Ask your healthcare  provider for a referral to a lifestyle intervention program. This program will help you get to and stay at a 7% weight loss and increase physical activity.   Follow-up  If not treated, prediabetes can turn into diabetes. This is a serious health condition. Take steps to stop this from happening. Follow the treatment plan you have been given. You may have your blood glucose tested again in about 12 to 18 months.   Diabetes symptoms   Let your healthcare provider know if you have any of the following:  · Always feel very tired  · Feel very thirsty or hungry much of the time  · Have to urinate often  · Lose weight for no reason  · Feel numbness or tingling in your fingers or toes  · Have cuts or bruises that don’t seem to heal  · Have blurry vision  Lisa last reviewed this educational content on 6/1/2019  © 2816-0738 [employer]. [address] [place] [address]. All rights reserved. This information is not intended as a substitute for professional medical care. Always follow your healthcare professional's instructions.            No

## 2023-05-24 NOTE — DIETITIAN INITIAL EVALUATION ADULT - OTHER CALCULATIONS
Upper-IBW used to calculate energy needs due to pt's current body weight exceeding 120% of IBW   Adjust for age and current conditions

## 2023-05-24 NOTE — DISCHARGE NOTE PROVIDER - NSDCCPCAREPLAN_GEN_ALL_CORE_FT
PRINCIPAL DISCHARGE DIAGNOSIS  Diagnosis: NSTEMI (non-ST elevation myocardial infarction)  Assessment and Plan of Treatment: You came into the hospital with chest pain and was found to have a small heart attach with abnormal EKG and elevated cardiac enzymes (troponin). 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 You will need to take antiplatelet medications Asprin and Plavix daily for the cardiac stent. DO NOT STOP taking these medications unless directed by your cardiologist as this can be LIFE THREATENING and lead to closing up of the cardiac stent and lead to a heart attack! 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      SECONDARY DISCHARGE DIAGNOSES  Diagnosis: HTN (hypertension)  Assessment and Plan of Treatment: Continue taking your blood pressure medication Amlodipine. In addition, we have started Metoprolol that can help the heart improve after heart attack and helps treat high blood pressure.    Diagnosis: HLD (hyperlipidemia)  Assessment and Plan of Treatment: Your cholesterol level was found to be elevated, where the LDL level (bad cholesterol) is 148. The goal LDL level for patients with coronary artery disease is less than 70. Too much cholesterol in your arteries may lead to a buildup of plaque known as atherosclerosis and contribute to heart disease. It is recommended to INCREASE your cholesterol medication Lipitor from 40mg to 80mg daily as having high cholesterol leads to heart disease.

## 2023-05-24 NOTE — DISCHARGE NOTE NURSING/CASE MANAGEMENT/SOCIAL WORK - PATIENT PORTAL LINK FT
You can access the FollowMyHealth Patient Portal offered by [facility] by registering at the following website: [email or web address]. By joining [employer]’s FollowMyHealth portal, you will also be able to view your health information using other applications (apps) compatible with our system.

## 2023-05-24 NOTE — DIETITIAN INITIAL EVALUATION ADULT - PERTINENT LABORATORY DATA
05-24    143  |  104  |  12  ----------------------------<  95  3.8   |  25  |  0.73    Ca    8.4      24 May 2023 07:03  Mg     2.2     05-24    TPro  6.8  /  Alb  3.4  /  TBili  1.2  /  DBili  x   /  AST  64<H>  /  ALT  28  /  AlkPhos  134<H>  05-23  A1C with Estimated Average Glucose Result: 5.2 % (05-23-23 @ 17:13)

## 2023-05-24 NOTE — DISCHARGE NOTE PROVIDER - NSDCCPTREATMENT_GEN_ALL_CORE_FT
PRINCIPAL PROCEDURE  Procedure: 2D echocardiography  Findings and Treatment: CONCLUSIONS:   1. Normal left ventricular size and systolic function.   2. Mild symmetric left ventricular hypertrophy.   3. Grade II left ventricular diastolic dysfunction.   4. Normal right ventricular size and systolic function.   5. Mildly dilated left atrium.   6. Mild-to-moderate mitral regurgitation.   7. Aortic sclerosis without significant stenosis.   8. Pulmonary hypertension present, pulmonary artery systolic pressure is 36 mmHg.   9. No pericardial effusion.  10. Compared to the previous TTE performed on 10/13/2016, the pulmonary artery systolic pressure is higher.

## 2023-05-25 ENCOUNTER — TRANSCRIPTION ENCOUNTER (OUTPATIENT)
Age: 83
End: 2023-05-25

## 2023-05-25 ENCOUNTER — INPATIENT (INPATIENT)
Facility: HOSPITAL | Age: 83
LOS: 0 days | Discharge: HOME | End: 2023-05-26
Attending: INTERNAL MEDICINE | Admitting: INTERNAL MEDICINE
Payer: MEDICARE

## 2023-05-25 ENCOUNTER — APPOINTMENT (OUTPATIENT)
Dept: CARE COORDINATION | Facility: HOME HEALTH | Age: 83
End: 2023-05-25
Payer: MEDICARE

## 2023-05-25 VITALS
TEMPERATURE: 98 F | HEART RATE: 84 BPM | DIASTOLIC BLOOD PRESSURE: 74 MMHG | RESPIRATION RATE: 18 BRPM | OXYGEN SATURATION: 93 % | WEIGHT: 147.05 LBS | HEIGHT: 59 IN | SYSTOLIC BLOOD PRESSURE: 138 MMHG

## 2023-05-25 DIAGNOSIS — Z98.89 OTHER SPECIFIED POSTPROCEDURAL STATES: Chronic | ICD-10-CM

## 2023-05-25 DIAGNOSIS — I25.5 ISCHEMIC CARDIOMYOPATHY: ICD-10-CM

## 2023-05-25 DIAGNOSIS — E78.5 HYPERLIPIDEMIA, UNSPECIFIED: ICD-10-CM

## 2023-05-25 DIAGNOSIS — I5A NON-ISCHEMIC MYOCARDIAL INJURY (NON-TRAUMATIC): ICD-10-CM

## 2023-05-25 DIAGNOSIS — R63.8 OTHER SYMPTOMS AND SIGNS CONCERNING FOOD AND FLUID INTAKE: ICD-10-CM

## 2023-05-25 DIAGNOSIS — I10 ESSENTIAL (PRIMARY) HYPERTENSION: ICD-10-CM

## 2023-05-25 LAB
ALBUMIN SERPL ELPH-MCNC: 3.6 G/DL — SIGNIFICANT CHANGE UP (ref 3.3–5)
ALP SERPL-CCNC: 119 U/L — SIGNIFICANT CHANGE UP (ref 40–120)
ALT FLD-CCNC: 19 U/L — SIGNIFICANT CHANGE UP (ref 10–45)
ANION GAP SERPL CALC-SCNC: 9 MMOL/L — SIGNIFICANT CHANGE UP (ref 5–17)
APTT BLD: 27.1 SEC — LOW (ref 27.5–35.5)
AST SERPL-CCNC: 38 U/L — SIGNIFICANT CHANGE UP (ref 10–40)
BILIRUB SERPL-MCNC: 1.5 MG/DL — HIGH (ref 0.2–1.2)
BUN SERPL-MCNC: 18 MG/DL — SIGNIFICANT CHANGE UP (ref 7–23)
CALCIUM SERPL-MCNC: 9.1 MG/DL — SIGNIFICANT CHANGE UP (ref 8.4–10.5)
CHLORIDE SERPL-SCNC: 101 MMOL/L — SIGNIFICANT CHANGE UP (ref 96–108)
CK MB CFR SERPL CALC: 4.4 NG/ML — SIGNIFICANT CHANGE UP (ref 0–6.7)
CK MB CFR SERPL CALC: 5.2 NG/ML — SIGNIFICANT CHANGE UP (ref 0–6.7)
CK SERPL-CCNC: 118 U/L — SIGNIFICANT CHANGE UP (ref 25–170)
CK SERPL-CCNC: 137 U/L — SIGNIFICANT CHANGE UP (ref 25–170)
CO2 SERPL-SCNC: 28 MMOL/L — SIGNIFICANT CHANGE UP (ref 22–31)
CREAT SERPL-MCNC: 1.01 MG/DL — SIGNIFICANT CHANGE UP (ref 0.5–1.3)
CULTURE RESULTS: SIGNIFICANT CHANGE UP
EGFR: 55 ML/MIN/1.73M2 — LOW
GLUCOSE SERPL-MCNC: 97 MG/DL — SIGNIFICANT CHANGE UP (ref 70–99)
INR BLD: 1.1 — SIGNIFICANT CHANGE UP (ref 0.88–1.16)
LIDOCAIN IGE QN: 28 U/L — SIGNIFICANT CHANGE UP (ref 7–60)
NT-PROBNP SERPL-SCNC: 2634 PG/ML — HIGH (ref 0–300)
POTASSIUM SERPL-MCNC: 3.8 MMOL/L — SIGNIFICANT CHANGE UP (ref 3.5–5.3)
POTASSIUM SERPL-SCNC: 3.8 MMOL/L — SIGNIFICANT CHANGE UP (ref 3.5–5.3)
PROT SERPL-MCNC: 6.1 G/DL — SIGNIFICANT CHANGE UP (ref 6–8.3)
PROTHROM AB SERPL-ACNC: 13.1 SEC — SIGNIFICANT CHANGE UP (ref 10.5–13.4)
SODIUM SERPL-SCNC: 138 MMOL/L — SIGNIFICANT CHANGE UP (ref 135–145)
SPECIMEN SOURCE: SIGNIFICANT CHANGE UP
TROPONIN T SERPL-MCNC: 2.19 NG/ML — CRITICAL HIGH (ref 0–0.01)
TROPONIN T SERPL-MCNC: 2.26 NG/ML — CRITICAL HIGH (ref 0–0.01)

## 2023-05-25 PROCEDURE — 80053 COMPREHEN METABOLIC PANEL: CPT

## 2023-05-25 PROCEDURE — 85610 PROTHROMBIN TIME: CPT

## 2023-05-25 PROCEDURE — 85730 THROMBOPLASTIN TIME PARTIAL: CPT

## 2023-05-25 PROCEDURE — 83036 HEMOGLOBIN GLYCOSYLATED A1C: CPT

## 2023-05-25 PROCEDURE — 99283 EMERGENCY DEPT VISIT LOW MDM: CPT | Mod: 25

## 2023-05-25 PROCEDURE — 93306 TTE W/DOPPLER COMPLETE: CPT

## 2023-05-25 PROCEDURE — 83735 ASSAY OF MAGNESIUM: CPT

## 2023-05-25 PROCEDURE — 71045 X-RAY EXAM CHEST 1 VIEW: CPT | Mod: 26

## 2023-05-25 PROCEDURE — 80048 BASIC METABOLIC PNL TOTAL CA: CPT

## 2023-05-25 PROCEDURE — 99291 CRITICAL CARE FIRST HOUR: CPT | Mod: 25

## 2023-05-25 PROCEDURE — C1760: CPT

## 2023-05-25 PROCEDURE — 82962 GLUCOSE BLOOD TEST: CPT

## 2023-05-25 PROCEDURE — 36415 COLL VENOUS BLD VENIPUNCTURE: CPT

## 2023-05-25 PROCEDURE — C1887: CPT

## 2023-05-25 PROCEDURE — C1889: CPT

## 2023-05-25 PROCEDURE — 85347 COAGULATION TIME ACTIVATED: CPT

## 2023-05-25 PROCEDURE — 96376 TX/PRO/DX INJ SAME DRUG ADON: CPT

## 2023-05-25 PROCEDURE — 82553 CREATINE MB FRACTION: CPT

## 2023-05-25 PROCEDURE — C1874: CPT

## 2023-05-25 PROCEDURE — 80061 LIPID PANEL: CPT

## 2023-05-25 PROCEDURE — 87086 URINE CULTURE/COLONY COUNT: CPT

## 2023-05-25 PROCEDURE — 96374 THER/PROPH/DIAG INJ IV PUSH: CPT

## 2023-05-25 PROCEDURE — 99495 TRANSJ CARE MGMT MOD F2F 14D: CPT | Mod: 95

## 2023-05-25 PROCEDURE — C1894: CPT

## 2023-05-25 PROCEDURE — 83880 ASSAY OF NATRIURETIC PEPTIDE: CPT

## 2023-05-25 PROCEDURE — 99222 1ST HOSP IP/OBS MODERATE 55: CPT

## 2023-05-25 PROCEDURE — 93005 ELECTROCARDIOGRAM TRACING: CPT

## 2023-05-25 PROCEDURE — 84484 ASSAY OF TROPONIN QUANT: CPT

## 2023-05-25 PROCEDURE — 85025 COMPLETE CBC W/AUTO DIFF WBC: CPT

## 2023-05-25 PROCEDURE — C1769: CPT

## 2023-05-25 PROCEDURE — 99285 EMERGENCY DEPT VISIT HI MDM: CPT

## 2023-05-25 PROCEDURE — 96375 TX/PRO/DX INJ NEW DRUG ADDON: CPT

## 2023-05-25 PROCEDURE — 71045 X-RAY EXAM CHEST 1 VIEW: CPT

## 2023-05-25 PROCEDURE — 82803 BLOOD GASES ANY COMBINATION: CPT

## 2023-05-25 PROCEDURE — 81001 URINALYSIS AUTO W/SCOPE: CPT

## 2023-05-25 PROCEDURE — 82550 ASSAY OF CK (CPK): CPT

## 2023-05-25 PROCEDURE — C1725: CPT

## 2023-05-25 RX ORDER — ATORVASTATIN CALCIUM 40 MG/1
40 TABLET, FILM COATED ORAL
Refills: 0 | Status: COMPLETED | COMMUNITY
End: 2023-05-25

## 2023-05-25 RX ORDER — AMLODIPINE BESYLATE 5 MG/1
5 TABLET ORAL
Refills: 0 | Status: COMPLETED | COMMUNITY
End: 2023-05-25

## 2023-05-25 RX ORDER — AMLODIPINE BESYLATE 2.5 MG/1
1 TABLET ORAL
Refills: 0 | DISCHARGE

## 2023-05-25 RX ORDER — AMLODIPINE BESYLATE 2.5 MG/1
2.5 TABLET ORAL DAILY
Refills: 0 | Status: DISCONTINUED | OUTPATIENT
Start: 2023-05-25 | End: 2023-05-26

## 2023-05-25 RX ORDER — METOPROLOL TARTRATE 50 MG
25 TABLET ORAL DAILY
Refills: 0 | Status: DISCONTINUED | OUTPATIENT
Start: 2023-05-25 | End: 2023-05-26

## 2023-05-25 RX ORDER — HEPARIN SODIUM 5000 [USP'U]/ML
5000 INJECTION INTRAVENOUS; SUBCUTANEOUS EVERY 8 HOURS
Refills: 0 | Status: DISCONTINUED | OUTPATIENT
Start: 2023-05-25 | End: 2023-05-26

## 2023-05-25 RX ORDER — ATORVASTATIN CALCIUM 80 MG/1
80 TABLET, FILM COATED ORAL AT BEDTIME
Refills: 0 | Status: DISCONTINUED | OUTPATIENT
Start: 2023-05-25 | End: 2023-05-26

## 2023-05-25 RX ORDER — ASPIRIN/CALCIUM CARB/MAGNESIUM 324 MG
81 TABLET ORAL DAILY
Refills: 0 | Status: DISCONTINUED | OUTPATIENT
Start: 2023-05-25 | End: 2023-05-26

## 2023-05-25 RX ORDER — CLOPIDOGREL BISULFATE 75 MG/1
75 TABLET, FILM COATED ORAL EVERY 24 HOURS
Refills: 0 | Status: DISCONTINUED | OUTPATIENT
Start: 2023-05-25 | End: 2023-05-26

## 2023-05-25 RX ORDER — PANTOPRAZOLE SODIUM 20 MG/1
40 TABLET, DELAYED RELEASE ORAL
Refills: 0 | Status: DISCONTINUED | OUTPATIENT
Start: 2023-05-25 | End: 2023-05-26

## 2023-05-25 RX ADMIN — CLOPIDOGREL BISULFATE 75 MILLIGRAM(S): 75 TABLET, FILM COATED ORAL at 22:48

## 2023-05-25 NOTE — H&P ADULT - PROBLEM SELECTOR PLAN 4
Fluids:   Electrolytes: Mg>2, K>4  Nutrition:  No IVF currently needed, replete lytes PRN  Prophylaxis: lovenox  Activity: AAT, OOBTC  GI: PPI  C: FC  Dispo: Admit to [facility] Fluids: NI   Electrolytes: Mg>2, K>4  Nutrition:  No IVF currently needed, replete lytes PRN  Prophylaxis: heparin  Activity: AAT, OOBTC  GI: PPI  C: FC  Dispo: Admit to F Fluids: NI   Electrolytes: Mg>2, K>4  Nutrition:  No IVF currently needed, replete lytes PRN  Prophylaxis: heparin  Activity: AAT, OOBTC  GI: PPI  C: FC  Dispo: Admit to cardiac tele

## 2023-05-25 NOTE — ED ADULT TRIAGE NOTE - BP NONINVASIVE SYSTOLIC (MM HG)
Progress Note  Hospital Medicine    Admit Date: 7/12/2019  Length of Stay:  LOS: 18 days     SUBJECTIVE:         Follow-up For:  Bacteremia due to group B Streptococcus    HPI/Interval history (See H&P for complete P,F,SHx) :   Over view  Indio Ryder Jr. is a 49 y.o. male with a PMH of T2DM (poorly  controlled, with long term insulin use) and HTN who presented to the ED on 7/12/2019 diagnosed with  Vomiting (vomiting for past 4 days, denies sick contacts)   Oriented x3 accompanied by daughter at the bedside.  Mentions that he wears lower extremity stockings for edema. Reportedly formed a new ulcer on the left lateral aspect of the foot about 10 days back.  had 4-5 days of nausea and vomiting. Vomiting is non-bloody, No abdominal pain..  It occurs immediately after eating and he has not been able to keep anything down. Associated with fevers and chills but no objective temperature measurement.  At baseline only takes insulin once a day stop taking more than a week because of nausea and vomiting and poor oral intake.  Does not check fingersticks regularly at home. recently lost his insurance and has been unable to follow up with wound care.  His last follow-up with primary care provider and podiatrist was about 2 years.  He has been managing his right foot ulcer at home with dressing but over the past 2 weeks an ulcer on his left foot has developed. He denies any pain or injury- has chronic numbness of bilateral lower extremities and hands from diabetic neuropathy.  Found to be in DKA in the emergency department with beta hydroxybutyrate elevated at 4.1.  Started on insulin drip    07/13/2019   Temperature of 101.5 last night.  Anion gap resolved- bicarbonate 23, insulin drip discontinued.  Started on Levemir 23 units q.a.m. 7 units as part t.i.d. with meals with low-dose sliding scale.  Diabetic diet and NPO from midnight for possible intervention.  MRI of the foot- Ulceration involving the lateral plantar  aspect of the distal left foot, with findings concerning for exposed bone involving the distal aspect of the 5th metatarsal and proximal phalange of the 5th toe.  There are associated changes concerning for osteomyelitis involving the distal aspect of the 5th metatarsal and entirety of the right toe.  There is osseous hyperemia involving the proximal phalange of the 2nd toe, early changes of osteomyelitis are a consideration . aerobic culture  and blood culture with Group B strep. discontinued vancomycin       07/14/2019  Had urinary retention of 800 mL but spontaneously voided.  Started on transitional insulin drip as NPO from midnight for OR today.  Hypokalemia repeat.  Blood cultures daily until clear.  Aerobic cultures with group B Streptococcus and Staph aureus.  Added vancomycin ( monitor for toxicity)    07/15/2019   s/p I&D and 5th ray amputation in the OR  on  07/14/2019.  ALISSA ordered to evaluate vascular status intraoperative Cultures pending . clean margin cultures and pathology pending. Deescalated from vanc and zosyn to cefazolin 6 gram IV cont infusion.  blood cultures from 07/13/2019 no growth.  Continues to have nausea vomiting since unable to tolerate anything by mouth.  Started on IV hydration and Reglan    07/23/2019  underwent repeat I&D and left partial 5th ray amputation 7/17.   Per OP note,  purulent drainage was expressed proximal to distal from the level of the plantar aspect of the left calcaneus to the open wound on the plantar midfoot. Purulent drainage was also manually expressed from the plantar distal aspect of the forefoot. Cultures of this were sent.   A small portion of the distal metatarsal was reported as sent as a clean margin to Micro and pathology.  The wound was partially closed, Neox allograft and wound Vac placed.  Repeat blood cultures remain NGTD.   ABIs without significant PAD.   Tolerating oral diet without nausea and vomiting.  Status post PICC line placement on  07/23/2019.  Wound VAC in place to left foot.  PT evaluation, crutches ordered per Podiatry.  No weight-bearing to left foot    7/27/19  Insurance denied rehab placement.  s/p  peer to peer. ID recommending  Cefazolin 6 gram IV continuous infusion q 24 hours for 6 weeks from date of surgery as unclear if residual osteomyelitis is present without clean margin culture and pathology results (end date 8/28/19). will qualify for SNF per insurance     Interval history   . started on gabapentin for neuropathic pain in  hands    Review of Systems: List if applicable  Constitutional: Positive for activity change, appetite change (Poor appetite for the last and), chills, fatigue, fever ( weight loss) and unexpected weight change.   HENT: Negative for congestion, ear discharge, ear pain, facial swelling and sore throat.    Eyes: Negative for pain, discharge and itching.   Respiratory: Positive for shortness of breath ( at rest and exertion ). Negative for cough, chest tightness and wheezing.    Cardiovascular: Positive for leg swelling ( chronic). Negative for chest pain and palpitations.   Gastrointestinal: Positive for vomiting improved. Negative for abdominal distention, abdominal pain, blood in stool, constipation and diarrhea.   Endocrine: Negative for cold intolerance.   Genitourinary: Negative for dysuria, hematuria and urgency.   Musculoskeletal: Negative for arthralgias, gait problem, myalgias, neck pain and neck stiffness.   Skin: Negative for color change, pallor and rash.   Allergic/Immunologic: Negative for environmental allergies.   Neurological: Positive for weakness and numbness ( bilateral lower extremities and hands attributes to diabetic neuropathy). Negative for dizziness, syncope, light-headedness and headaches.   Hematological: Negative for adenopathy.   Psychiatric/Behavioral: Negative for agitation, behavioral problems and confusion.     OBJECTIVE:     Vital Signs Range (Last 24H):  Temp:  [97.8 °F  (36.6 °C)-99.2 °F (37.3 °C)]   Pulse:  []   Resp:  [16-18]   BP: (100-139)/(66-87)   SpO2:  [95 %-99 %]     Physical Exam:  Constitutional: He is oriented to person, place, and time. He appears well-developed and well-nourished.   HENT:   Head: Normocephalic and atraumatic.   Mouth/Throat: Oropharynx is clear and moist. No oropharyngeal exudate.   Eyes: Pupils are equal, round, and reactive to light. Conjunctivae and EOM are normal. Right eye exhibits no discharge. Left eye exhibits no discharge. No scleral icterus.   Neck: Normal range of motion. Neck supple. No JVD present. No tracheal deviation present. No thyromegaly present.   Cardiovascular: Normal rate, regular rhythm and normal heart sounds. Exam reveals no gallop and no friction rub.   No murmur heard.  Pulmonary/Chest: Effort normal and breath sounds normal. No stridor. No respiratory distress. He has no wheezes. He has no rales.   Abdominal: Soft. Bowel sounds are normal. He exhibits no distension and no mass. There is no tenderness. There is no guarding.   Musculoskeletal: Normal range of motion. He exhibits edema.   Lymphadenopathy:     He has no cervical adenopathy.   Neurological: He is oriented to person, place, and time. No cranial nerve deficit.   Able to move upper and lower extremities without limitation   Skin: Skin is warm and dry.   Left foot:  With wound VAC in place.     Right foot: 1.5cm clean based ulcer at base of right 5th MTP joint. Right great toe and second toe surgically amputated.     Psychiatric: He has a normal mood and affect. His behavior is normal.   Nursing note and vitals reviewed    Medications:  Medication list was reviewed and changes noted under Assessment/Plan.      Current Facility-Administered Medications:     acetaminophen tablet 650 mg, 650 mg, Oral, Q6H PRN, Vicente Wick MD, 650 mg at 07/23/19 1938    amLODIPine tablet 10 mg, 10 mg, Oral, Daily, Vicente Wick MD, Stopped at 07/30/19 0904     atorvastatin tablet 80 mg, 80 mg, Oral, Daily, Vicente Wick MD, 80 mg at 07/30/19 0904    calcium carbonate 200 mg calcium (500 mg) chewable tablet 1,000 mg, 1,000 mg, Oral, BID PRN, Antonio Tate PA-C, 1,000 mg at 07/25/19 1601    ceFAZolin (ANCEF) 6 g in dextrose 5 % 500 mL CONTINUOUS INFUSION, 6 g, Intravenous, Q24H, RAMILA Kinsey, 6 g at 07/29/19 1254    dextrose 10% (D10W) Bolus, 12.5 g, Intravenous, PRN, Nakul Patel MD    dextrose 10% (D10W) Bolus, 25 g, Intravenous, PRN, Nakul Patel MD    enoxaparin injection 40 mg, 40 mg, Subcutaneous, Daily, Vicente Wick MD, 40 mg at 07/29/19 1756    [COMPLETED] gabapentin capsule 300 mg, 300 mg, Oral, Once, 300 mg at 07/28/19 1500 **FOLLOWED BY** [COMPLETED] gabapentin capsule 300 mg, 300 mg, Oral, BID, 300 mg at 07/29/19 2046 **FOLLOWED BY** gabapentin capsule 300 mg, 300 mg, Oral, TID, Vicente Wick MD, 300 mg at 07/30/19 0904    glucagon (human recombinant) injection 1 mg, 1 mg, Intramuscular, PRN, Nakul Patel MD    glucose chewable tablet 16 g, 16 g, Oral, PRN, Nakul Patel MD    glucose chewable tablet 24 g, 24 g, Oral, PRN, Nakul Patel MD    hydrALAZINE tablet 25 mg, 25 mg, Oral, Q8H PRN, Vicente Wick MD, 25 mg at 07/23/19 0804    insulin aspart U-100 pen 0-5 Units, 0-5 Units, Subcutaneous, QID (AC + HS) PRN, Nakul Patel MD, 2 Units at 07/30/19 0755    insulin aspart U-100 pen 13 Units, 13 Units, Subcutaneous, TIDWM, Roselyn Chow MD    insulin aspart U-100 pen 3 Units, 3 Units, Subcutaneous, PRN, Javid Saini MD    insulin detemir U-100 pen 41 Units, 41 Units, Subcutaneous, QHS, Roselyn Chow MD    ondansetron injection 8 mg, 8 mg, Intravenous, Q8H PRN, Zeynep Duke PA-C    promethazine (PHENERGAN) 12.5 mg in dextrose 5 % 50 mL IVPB, 12.5 mg, Intravenous, Q6H PRN, Zeynep Duke PA-C, Last Rate: 150 mL/hr at 07/18/19 0012, 12.5 mg at 07/18/19 0012    sodium chloride 0.9%  flush 10 mL, 10 mL, Intravenous, PRN, Vicente Wick MD    sodium chloride 0.9% flush 10 mL, 10 mL, Intravenous, PRN, Mariela Damon MD    Flushing PICC Protocol, , , Until Discontinued **AND** sodium chloride 0.9% flush 10 mL, 10 mL, Intravenous, Q6H, 10 mL at 07/29/19 1756 **AND** sodium chloride 0.9% flush 10 mL, 10 mL, Intravenous, PRN, Vicente Wick MD    acetaminophen, calcium carbonate, Dextrose 10% Bolus, Dextrose 10% Bolus, glucagon (human recombinant), glucose, glucose, hydrALAZINE, insulin aspart U-100, insulin aspart U-100, ondansetron, promethazine (PHENERGAN) IVPB, sodium chloride 0.9%, sodium chloride 0.9%, Flushing PICC Protocol **AND** sodium chloride 0.9% **AND** sodium chloride 0.9%    Laboratory/Diagnostic Data:  Reviewed and noted in plan where applicable- Please see chart for full lab data.    Recent Labs   Lab 07/28/19 0519 07/29/19  0530 07/30/19  0510   WBC 5.39 5.66 5.61   HGB 10.4* 10.4* 10.1*   HCT 34.3* 33.3* 33.8*    267 244       Recent Labs   Lab 07/27/19 2302 07/28/19  0519 07/29/19  0530 07/30/19  0510    139 137  --    K 4.0 4.4 4.2  --    CL 96 98 96  --    CO2 33* 30* 32*  --    BUN 14 13 19  --    CREATININE 0.8 0.9 1.1  --    * 251* 266*  --    CALCIUM 9.6 9.8 9.5  --    MG  --  1.6 1.6 1.6   PHOS  --  4.2 3.7 3.1       Recent Labs   Lab 07/27/19  2302 07/28/19  0519 07/29/19  0530   ALKPHOS 154* 155* 151*   ALT 9* 10 9*   AST 29 29 17   ALBUMIN 2.5* 2.5* 2.6*   PROT 7.2 7.3 7.5   BILITOT 0.2 0.2 0.2        Microbiology labs for the last week  Microbiology Results (last 7 days)     Procedure Component Value Units Date/Time    Culture, Anaerobe [[id number]] Collected:  07/17/19 1214    Order Status:  Completed Specimen:  Wound from Foot, Left Updated:  07/26/19 1316     Anaerobic Culture No anaerobes isolated    Narrative:       Left foot wound    Fungus culture [[id number]] Collected:  07/17/19 1214    Order Status:  Completed Specimen:   Wound from Foot, Left Updated:  07/24/19 1044     Fungus (Mycology) Culture Culture in progress    Narrative:       Left foot wound           Imaging Results          MRI Foot (Forefoot) Left Without Contrast (Final result)  Result time 07/12/19 19:07:38    Final result by Bob Oglesby MD (07/12/19 19:07:38)                 Impression:      Ulceration involving the lateral plantar aspect of the distal left foot, with findings concerning for exposed bone involving the distal aspect of the 5th metatarsal and proximal phalange of the 5th toe.  There are associated changes concerning for osteomyelitis involving the distal aspect of the 5th metatarsal and entirety of the right toe.  There is osseous hyperemia involving the proximal phalange of the 2nd toe, early changes of osteomyelitis are a consideration no definite low signal on T1 at this time.      Electronically signed by: Bob Oglesby MD  Date:    07/12/2019  Time:    19:07             Narrative:    EXAMINATION:  MRI FOOT (FOREFOOT) LEFT WITHOUT CONTRAST    CLINICAL HISTORY:  Open wound of ankle, foot and toes;    TECHNIQUE:  Multiplanar multisequence MRI images of the left forefoot were obtained without administration of IV contrast.    COMPARISON:  Radiograph 07/12/2019    FINDINGS:  There is soft tissue ulceration involving the lateral plantar aspect of the right foot, noting there appears to be exposed bone in the location, likely involving the distal aspect of the 5th metatarsal head, and proximal phalange of the 5th toe. There is diffusely increased STIR signal within the distal aspect of the 5th metatarsal, and throughout the phalanges of the 5th toe. There is corresponding low T1 signal involving the same regions, concerning for osteomyelitis. There is additional abnormal STIR signal within the base of the proximal phalange of the 2nd toe, without convincing low signal on T1, suggesting osseous hyperemia although early changes of osteomyelitis  remain a consideration. There is edema and induration about the open wound, no convincing focal organized fluid collection.  The remainder of the osseous structures are grossly unremarkable. No significant joint effusions.  There is reactive edema about the intrinsic musculature of the foot, as well as along the dorsal surface.                                X-Ray Foot Complete Left (Final result)  Result time 07/12/19 11:06:16    Final result by Chandan Montana MD (07/12/19 11:06:16)                 Impression:      Abnormal examination with findings strongly suggesting osteomyelitis involving the base of the proximal phalanx of the left 5th toe and likely the head of the 5th metatarsal as well.    This report was flagged in Epic as abnormal.      Electronically signed by: Chandan Montana MD  Date:    07/12/2019  Time:    11:06             Narrative:    EXAMINATION:  XR FOOT COMPLETE 3 VIEW LEFT    TECHNIQUE:  Three views of the left foot were obtained, with AP, lateral, and oblique projections submitted.    COMPARISON:  No relevant comparison examinations are currently available.    FINDINGS:  Focal soft tissue loss/irregularity involving the region of the left 5th MTP joint is observed, with gas present within the soft tissues at this level, the findings likely related to a clinically evident ulceration.  There is focal lytic bone destruction involving the base of the proximal phalanx of the 5th toe, and possibly involving the head of the 5th metatarsal as well, this radiographic appearance strongly suggesting osteomyelitis.  The remaining osseous structures appear intact, with no evidence of recent fracture and no additional areas of focal lytic bone destruction noted.  Some arterial vascular calcification in the soft tissues about the ankle is incidentally observed.                               X-Ray Chest AP Portable (Final result)  Result time 07/12/19 10:48:19    Final result by Chandan Montana MD (07/12/19 10:48:19)  "                Impression:      No significant intrathoracic abnormality.  Allowing for differences in projection and a poorer inspiratory depth level on the current examination, there has been no significant detrimental interval change in the appearance of the chest since 05/03/2016.      Electronically signed by: Chandan Montana MD  Date:    07/12/2019  Time:    10:48             Narrative:    EXAMINATION:  XR CHEST AP PORTABLE    CLINICAL HISTORY:  hyperglycemia;    COMPARISON:  Comparison is made to the most recent prior chest radiograph, dated 05/03/2016.    FINDINGS:  Heart size is normal, as is the appearance of the pulmonary vascularity.  Lung zones are clear, and free of significant airspace consolidation or volume loss.  No pleural fluid.  No abnormal mediastinal widening.  No pneumothorax.  Incidental note is made of some spurring at the right acromioclavicular joint level.                                Estimated body mass index is 25.68 kg/m² as calculated from the following:    Height as of this encounter: 6' 1" (1.854 m).    Weight as of this encounter: 88.3 kg (194 lb 10.7 oz).    I & O (Last 24H):    Intake/Output Summary (Last 24 hours) at 7/30/2019 1126  Last data filed at 7/30/2019 0500  Gross per 24 hour   Intake 250 ml   Output 900 ml   Net -650 ml       Estimated Creatinine Clearance: 89.8 mL/min (based on SCr of 1.1 mg/dL).    ASSESSMENT/PLAN:     Active Problems:    Active Diagnoses:     Diagnosis Date Noted POA    PRINCIPAL PROBLEM:  Acute osteomyelitis [M86.10] x-ray left foot - strongly suggesting osteomyelitis involving the base of the proximal phalanx of the left 5th toe and likely the head of the 5th metatarsal as well.        recently lost his insurance and has been unable to follow up with wound care.  His last follow-up with primary care provider and podiatrist was about 2 years.  He has been managing his right foot ulcer at home with dressing but over the past 2 weeks an ulcer on his " left foot has developed. He denies any pain or injury- has chronic numbness of bilateral lower extremities and hands from diabetic neuropathy.  Started on IV Zosyn and vanc.  Podiatry and Infectious Disease consult.  aerobic culture with Group B strep.  Add vancomycin for Staph aureus  (monitor for toxicity)    07/14/2019   s/p I&D and 5th ray amputation in the OR  on  07/14/2019.  No weight-bearing left lower x-ray ALISSA ordered to evaluate vascular status intraoperative Cultures pending . clean margin cultures and pathology pending. Deescalated from vanc and zosyn to cefazolin 6 gram IV cont infusion.  blood cultures from 07/13/2019 no growth.  Continues to have nausea vomiting since unable to tolerate anything by mouth.  Started on IV hydration and Reglan    07/23/19  underwent repeat I&D and left partial 5th ray amputation 7/17.   Per OP note,  purulent drainage was expressed proximal to distal from the level of the plantar aspect of the left calcaneus to the open wound on the plantar midfoot. Purulent drainage was also manually expressed from the plantar distal aspect of the forefoot. Cultures of this were sent.   A small portion of the distal metatarsal was reported as sent as a clean margin to Micro and pathology.  The wound was partially closed, Neox allograft and wound Vac placed.  Repeat blood cultures remain NGTD.   ABIs without significant PAD.   Tolerating oral diet without nausea and vomiting.  Status post PICC line placement on 07/23/2019.  Wound VAC in place to left foot.  PT evaluation, crutches ordered per Podiatry.  No weight-bearing to left foot .     7/27/19  Insurance denied rehab placement.  s/p  peer to peer. ID recommending  Cefazolin 6 gram IV continuous infusion q 24 hours for 6 weeks from date of surgery as unclear if residual osteomyelitis is present without clean margin culture and pathology results (end date 8/28/19). will qualify for SNF per insurance    07/12/2019 Yes    Sepsis  [A41.9] /bacteremia - blood cultures from 07/12/2019 group B Streptococcus. secondary to diabetic foot ulcer.  Elevated ESR greater than 120 As above 07/12/2019 Yes    Leukocytosis [D72.829] 14 secondary to sepsis. resolved  07/12/2019 Unknown    Anemia [D64.9] hemoglobin at 10.4, stable, normocytic.  Monitor 07/12/2019 Unknown    Type 2 diabetes mellitus with left eye affected by mild nonproliferative retinopathy without macular edema, without long-term current use of insulin [E11.3292]At baseline only takes insulin once a day stopped  taking more than a week because of nausea and vomiting and poor oral intake.  Does not check fingersticks regularly at home. recently lost his insurance.  Obtain HbA1c elevated greater than 14.   on Levemir 28 units nightly and NovoLog 11 units t.i.d. with meals 08/11/2017 Yes       Chronic    Diabetic ketoacidosis without coma associated with type 2 diabetes mellitus [E11.10]  Found to be in DKA in the emergency department with beta hydroxybutyrate elevated at 4.1.  Started on insulin drip.  Received normal saline in the emergency department    07/13/2019  Anion gap resolved- bicarbonate 23, insulin drip discontinued.        hypomagnesemia replaced    Hypophosphatemia- placed   05/30/2017 Unknown    Type 2 diabetes mellitus with diabetic neuropathy, with long-term current use of insulin [E11.40, Z79.4] .  Blood glucose uncontrolled continue with Levemir 37 units q.h.s. and NovoLog 12 units with meals and low-dose correction scale. started on gabapentin for neuropathic pain in  hands (7/28/19) 05/03/2016 Not Applicable       Chronic    Mixed hyperlipidemia [E78.2] continue with Lipitor 08/12/2014 Yes    Essential hypertension [I10]  amlodipine dose increased to 10 mg daily.  06/06/2013 Yes       Chronic               Disposition- SNF    DVT prophylaxis addressed with:  Brian Rust M.D.   138

## 2023-05-25 NOTE — PHYSICAL EXAM
[No Acute Distress] : no acute distress [Well Nourished] : well nourished [Well Developed] : well developed [Well-Appearing] : well-appearing [Normal Sclera/Conjunctiva] : normal sclera/conjunctiva [Normal Outer Ear/Nose] : the outer ears and nose were normal in appearance [No JVD] : no jugular venous distention [No Respiratory Distress] : no respiratory distress  [No Accessory Muscle Use] : no accessory muscle use [No Varicosities] : no varicosities [No Edema] : there was no peripheral edema [Non-distended] : non-distended [Normal Gait] : normal gait [Normal Affect] : the affect was normal [Normal Insight/Judgement] : insight and judgment were intact [de-identified] : limited due to tele visit  [de-identified] : Right groin cath access site c/d/i

## 2023-05-25 NOTE — ED PROVIDER NOTE - NS_BEDUNITTYPES_ED_ALL_ED
Hany from [facility] states Dr Watt needs to sign page 14 and use date of 1-23-23 of the PTE Eval, please fax back to [phone]   TELE/STEPDOWN

## 2023-05-25 NOTE — ED ADULT NURSE NOTE - NSFALLUNIVINTERV_ED_ALL_ED
Bed/Stretcher in lowest position, wheels locked, appropriate side rails in place/Call bell, personal items and telephone in reach/Instruct patient to call for assistance before getting out of bed/chair/stretcher/Non-slip footwear applied when patient is off stretcher/[place] to call system/Physically safe environment - no spills, clutter or unnecessary equipment/Purposeful proactive rounding/Room/bathroom lighting operational, light cord in reach

## 2023-05-25 NOTE — ED PROVIDER NOTE - CLINICAL SUMMARY MEDICAL DECISION MAKING FREE TEXT BOX
PCI to mid RCA, ecg improved compared to old, symptoms milder, now resolved --> as per interventional cardiology  pt has microvascular disease, recommended to start RENEXA 500mg BIG ( Dr Reaves)   states as long as trop trending down from 2 days ago  pt ok to go home PCI to mid RCA, ecg improved compared to old, symptoms milder, now resolved --> as per interventional cardiology  pt has microvascular disease, recommended to start RENEXA 500mg BIG ( Dr Reaves) if discharged   states as long as trop trending down from 2 days ago  pt ok to go home if trop trending down. trop trending up  interventional cardiology called again, states to admit to cardiology

## 2023-05-25 NOTE — H&P ADULT - NSHPLABSRESULTS_GEN_ALL_CORE
.  LABS:                         14.8   12.06 )-----------( 218      ( 24 May 2023 07:03 )             45.5     05-25    138  |  101  |  18  ----------------------------<  97  3.8   |  28  |  1.01    Ca    9.1      25 May 2023 18:46  Mg     2.2     05-24    TPro  6.1  /  Alb  3.6  /  TBili  1.5<H>  /  DBili  x   /  AST  38  /  ALT  19  /  AlkPhos  119  05-25    PT/INR - ( 25 May 2023 18:46 )   PT: 13.1 sec;   INR: 1.10          PTT - ( 25 May 2023 18:46 )  PTT:27.1 sec    CARDIAC MARKERS ( 25 May 2023 18:46 )  x     / 2.26 ng/mL / 137 U/L / x     / 5.2 ng/mL            RADIOLOGY, EKG & ADDITIONAL TESTS: Reviewed.

## 2023-05-25 NOTE — H&P ADULT - NSHPPHYSICALEXAM_GEN_ALL_CORE
.  VITAL SIGNS:  T(F): 98.3 (05-25-23 @ 18:23), Max: 98.3 (05-25-23 @ 18:23)  HR: 76 (05-25-23 @ 19:21) (76 - 84)  BP: 127/68 (05-25-23 @ 19:21) (127/68 - 138/74)  BP(mean): --  RR: 18 (05-25-23 @ 19:21) (18 - 18)  SpO2: 97% (05-25-23 @ 19:21) (93% - 97%)    PHYSICAL EXAM:    Constitutional: resting comfortably in bed; NAD  HEENT: NC/AT, PERRL, EOMI, anicteric sclera, no nasal discharge; uvula midline, no oropharyngeal erythema or exudates; MMM  Neck: supple; no JVD or thyromegaly  Respiratory: unlabored breathing, CTA B/L; no W/Rhonchi/Crackles, no retractions or use of accessory muscles   Cardiac: +S1/S2; RRR; no M/R/G; No ventricular heaves, PMI non-displaced  Gastrointestinal: soft, NT/ND; no rebound or guarding; +BSx4  Extremities: WWP, no clubbing or cyanosis; no peripheral edema  Musculoskeletal: NROM x4; no joint swelling, tenderness or erythema  Vascular: 2+ radial, DP/PT pulses B/L  Dermatologic: skin warm, dry and intact; no rashes, wounds, or scars  Lymphatic: no submandibular or cervical LAD  Neurologic: AAOx3; CNII-XII grossly intact; no focal deficits

## 2023-05-25 NOTE — HISTORY OF PRESENT ILLNESS
[Home] : at home, [unfilled] , at the time of the visit. [Other Location: e.g. Home (Enter Location, City,State)___] : at [unfilled] [Verbal consent obtained from patient] : the patient, [unfilled] [FreeTextEntry1] : Follow-up for discharge from [facility] for AMI.\par  [de-identified] : Patient is a 82 year old female  enrolled in the [employer] program with a history of  former tobacco use (quit 30 years ago), HLD, HTN, PAD (s/p x1 stent in R LE), and GERD . Patient was admitted from 5/23/23 - 5/24/23 to [facility] for a diagnosis of AMI.\par \[facility] chart reviewed and copied as per [facility] Discharge Summary:\par " 81 y/o English speaking F with PMHX of former tobacco use (quit 30 years ago), HLD, HTN, PAD (s/p x1 stent in R LE), and GERD presented to [facility] ED (5/23/23) c/o indigestion nausea/vomiting associated with pressure like-SSCP w/ +rad to jaw/L arm, SOB and dizzinessx 1 day. Patient took her home Pepcid without relief of symptoms. Patient reports indigestion sx with associated CP has been intermittent for the last month but a stronger pressure-like CP sensation along with new onset of dizziness prompted her ED. PT also reports an increase in b/l LE swelling. EKG NSR at 83 bpm, with left axis deviation and biphasic T waves in leads III, AVF and TWI in V5, V6. -Labs: H&H stable, Toponin I (4622.3 ->6008.7, pro-BNP 2686. S/p Heparin gtt, Zofran 4 mg IVP,  mg PO (via EMS), NS 200cc/hr continuously during transfer. Patient transferred to [facility] for further treatment of NSTEMI. Pt underwent Cardiac cath (5/23/23) w/ Dr Hare: JUANI mRCA; LM normal, pLAD 60%, mLAD 60%, mLCx 80% D1 mild disease; LVEF 40%, LVEDP 21mmHg. S/p RHC (5/23/23): RA 8, RV 47/8, PA 43/18 (30), PCWP 23, PASat 66%, CO 4.25, CI 2.6. Underwent Pulmonary angiogram intraprocedure given hypoxia: negative for PE in pulm arteries. ACCESS: R CFA PC; R CFV 7Fr sheath removed - sites CDI w/o hematoma/oozing. S/p lasix 20mg IV x1 in procedure. Pt w/ c/w ASA/Plavix, Atorvastatin 80mg PO qHS. . Plan to return for staged PCI of LCx in 5wks. Echo w/ EF 55-60%. mild conc LVH, grade 2 diastolic dysfunction, mildly dilated LA, mild-mod MR. Hypoxia improved w/ use of incentive spirometer, SpO2 88% -> 95% RA. Pt reports recent Abx treatment for PNA w/ ?Levaquin ~2wks ago. On the day of discharge, the patient was seen and examined. Symptoms improved. Vital signs are stable. Labs and imaging reviewed. Pt is to follow up with cardiologist Dr Mcgrath in 1-2 weeks for post discharge check-up. Pt instructed to return to ED/seek immediate medical attention if symptoms of chest pain, SOB, LOC, bleeding from the access site. Pt agrees with the discharge plan, verbalizes understanding of the information given. All new medications explained risks/side effects and e-prescribed to patient preferred pharmacy. Not referred for Cardiac Rehab 2/2 pending stage PCI" \par \par Patient observed via [employer] [facility] and is alert and oriented x 3, in no acute distress. Patient observed sitting comfortably upright during time of visit, in NAD.  Patient states they are feeling well today. Patient states they are eating and drinking well. Patient reports compliance with medications. States she has f/u appointment scheduled with cardiology for 6/6/23. States she has mild fatigue but denies chest pain or SOB. States the cath access site is c/d/i with no pain or discharge. Reports she checks her BP daily. Requesting referral to be made for home care services. Denies any cough, fever, chills, abdominal pain, palpitations, nausea, vomiting, diarrhea, lightheadedness, dizziness, shortness of breath, or chest pain.\par \par Patient contacted by TCM RN within 48 hours of discharge and d/c instructions reviewed.  Discharge medications were reviewed and reconciled with the current medication list and medications in home. Patient had 48 hour follow up call done by WIN Swann on 5/25/23.\par

## 2023-05-25 NOTE — H&P ADULT - ASSESSMENT
82yM  PMHX of former tobacco use (quit 30 years ago), HLD, HTN, PAD (s/p x1 stent in R LE), GERD, and recent NSTEMI on 5/23/23 s/p mRCA JUANI w/planned PCI for LCx lesion in 5 weeks presents to [facility] ED for substernal burning sensation at 4:30pm PTA. Her ECG on initial ED presentation and repeat showed no evidence acute ischemia concerning for ACS, she has an elevated troponin level likely 2/2 reperfusion given that she has a normal CKMB. She is being admitted to cardiac telemetry for further trending of CE and management.       NB: Pending evaluation and attestation by a cardiology attending

## 2023-05-25 NOTE — ED ADULT NURSE NOTE - OBJECTIVE STATEMENT
81 y/o Female presents to ED with complaints of chest pain. BIB EMS. EMS administered 324mg of Aspirin prior to arrival. Patient had stents placed on Tuesday at [facility].  Pt reports spontaneously feeling a burning sensation in epigastric region at 4:30PM and lasted for approx one hour, no interventions by pt. Pt currently denies chest pain, SOB, back pain, cough, lightheadedness, dizziness, blurry vision, headache, numbness/tingling, N/V, GI/ pain. Pt A&Ox4, speaking in full coherent sentences on 2L NC. VSS. EKG completed, Labs sent. Will continue to monitor. 81 y/o Female presents to ED with complaints of chest pain. BIB EMS. EMS administered 324mg of Aspirin prior to arrival. Patient had stents placed on Tuesday at [facility].  Pt reports spontaneously feeling a burning sensation in epigastric region at 4:30PM and lasted for approx one hour, no interventions by pt. Pt currently denies chest pain, SOB, back pain, cough, lightheadedness, dizziness, blurry vision, headache, numbness/tingling, N/V, GI/ pain. Pt A&Ox4, speaking in full coherent sentences on 2L NC. VSS. EKG completed, continuos cardiac monitoring initiated.  Labs sent. Will continue to monitor.

## 2023-05-25 NOTE — ASSESSMENT
[FreeTextEntry1] : Patient is a 82 year old female  enrolled in the [employer] program with a history of  former tobacco use (quit 30 years ago), HLD, HTN, PAD (s/p x1 stent in R LE), and GERD . Patient was admitted from 5/23/23 - 5/24/23 to [facility] for a diagnosis of AMI.\par \[facility] chart reviewed and copied as per [facility] Discharge Summary:\par " 83 y/o English speaking F with PMHX of former tobacco use (quit 30 years ago), HLD, HTN, PAD (s/p x1 stent in R LE), and GERD presented to [facility] ED (5/23/23) c/o indigestion nausea/vomiting associated with pressure like-SSCP w/ +rad to jaw/L arm, SOB and dizzinessx 1 day. Patient took her home Pepcid without relief of symptoms. Patient reports indigestion sx with associated CP has been intermittent for the last month but a stronger pressure-like CP sensation along with new onset of dizziness prompted her ED. PT also reports an increase in b/l LE swelling. EKG NSR at 83 bpm, with left axis deviation and biphasic T waves in leads III, AVF and TWI in V5, V6. -Labs: H&H stable, Toponin I (4622.3 ->6008.7, pro-BNP 2686. S/p Heparin gtt, Zofran 4 mg IVP,  mg PO (via EMS), NS 200cc/hr continuously during transfer. Patient transferred to [facility] for further treatment of NSTEMI. Pt underwent Cardiac cath (5/23/23) w/ Dr Hare: JUANI mRCA; LM normal, pLAD 60%, mLAD 60%, mLCx 80% D1 mild disease; LVEF 40%, LVEDP 21mmHg. S/p RHC (5/23/23): RA 8, RV 47/8, PA 43/18 (30), PCWP 23, PASat 66%, CO 4.25, CI 2.6. Underwent Pulmonary angiogram intraprocedure given hypoxia: negative for PE in pulm arteries. ACCESS: R CFA PC; R CFV 7Fr sheath removed - sites CDI w/o hematoma/oozing. S/p lasix 20mg IV x1 in procedure. Pt w/ c/w ASA/Plavix, Atorvastatin 80mg PO qHS. . Plan to return for staged PCI of LCx in 5wks. Echo w/ EF 55-60%. mild conc LVH, grade 2 diastolic dysfunction, mildly dilated LA, mild-mod MR. Hypoxia improved w/ use of incentive spirometer, SpO2 88% -> 95% RA. Pt reports recent Abx treatment for PNA w/ ?Levaquin ~2wks ago. On the day of discharge, the patient was seen and examined. Symptoms improved. Vital signs are stable. Labs and imaging reviewed. Pt is to follow up with cardiologist Dr Mcgrath in 1-2 weeks for post discharge check-up. Pt instructed to return to ED/seek immediate medical attention if symptoms of chest pain, SOB, LOC, bleeding from the access site. Pt agrees with the discharge plan, verbalizes understanding of the information given. All new medications explained risks/side effects and e-prescribed to patient preferred pharmacy. Not referred for Cardiac Rehab 2/2 pending stage PCI" \par \par Patient observed via [email or web address] [facility] and is alert and oriented x 3, in no acute distress. Patient observed sitting comfortably upright during time of visit, in NAD.  Patient states they are feeling well today. Patient states they are eating and drinking well. Patient reports compliance with medications. States she has f/u appointment scheduled with cardiology for 6/6/23. States she has mild fatigue but denies chest pain or SOB. States the cath access site is c/d/i with no pain or discharge. Reports she checks her BP daily. Requesting referral to be made for home care services. Denies any cough, fever, chills, abdominal pain, palpitations, nausea, vomiting, diarrhea, lightheadedness, dizziness, shortness of breath, or chest pain.\par \par Patient contacted by TCM RN within 48 hours of discharge and d/c instructions reviewed.  Discharge medications were reviewed and reconciled with the current medication list and medications in home. Patient had 48 hour follow up call done by WIN Swann on 5/25/23.\par

## 2023-05-25 NOTE — REVIEW OF SYSTEMS
[Fever] : no fever [Chills] : no chills [Fatigue] : fatigue [Hot Flashes] : no hot flashes [Night Sweats] : no night sweats [Chest Pain] : no chest pain [Palpitations] : no palpitations [Leg Claudication] : no leg claudication [Lower Ext Edema] : no lower extremity edema [Orthopnea] : no orthopnea [Shortness Of Breath] : no shortness of breath [Wheezing] : no wheezing [Cough] : no cough [Dyspnea on Exertion] : no dyspnea on exertion [Abdominal Pain] : no abdominal pain [Nausea] : no nausea [Constipation] : no constipation [Diarrhea] : diarrhea [Vomiting] : no vomiting [Dysuria] : no dysuria [Incontinence] : no incontinence [Nocturia] : no nocturia [Hematuria] : no hematuria [Joint Pain] : no joint pain [Joint Stiffness] : no joint stiffness [Back Pain] : no back pain [Itching] : no itching [Skin Rash] : no skin rash [Headache] : no headache [Dizziness] : no dizziness [Fainting] : no fainting [Memory Loss] : no memory loss [Suicidal] : not suicidal [Insomnia] : no insomnia [Anxiety] : no anxiety [Easy Bleeding] : no easy bleeding [Easy Bruising] : no easy bruising [Negative] : ENT

## 2023-05-25 NOTE — H&P ADULT - HISTORY OF PRESENT ILLNESS
**Incomplete Note**    82yM  PMHX of former tobacco use (quit 30 years ago), HLD, HTN, PAD (s/p x1 stent in R LE), GERD, and recent NSTEMI on 5/23/23 s/p mRCA JUANI.      Pt seen and examined at bedside, NAD, ROS per above **Incomplete Note**    82yM  PMHX of former tobacco use (quit 30 years ago), HLD, HTN, PAD (s/p x1 stent in R LE), GERD, and recent NSTEMI on 5/23/23 s/p mRCA JUANI w/planned PCI for LCx lesion in 5 weeks presents to [facility] ED for substernal burning sensation at 4:30pm PTA. Said sxs were similar to her previous NSTEMI event 2 days ago prompting her to return to the ED. Of note, the pt has been taking baby aspirin since d/c on 5/24/23 however she did not yet take her plavix today due to going back to the ED. On ROS, she denies: fevers, chills, myalgias, dizziness, weakness, HA, dysphagia, dysarthria, changes in vision, CP/chest discomfort, palpitations, SOB, cough, PND, orthopnea, N/V/D/C, abdominal pain, dysuria, urinary urgency/increased frequency, changes in bowel movements, melena, hematochezia, LE edema, joint pain, or unintentional weightloss. ROS otherwise negative.      Pt seen and examined at bedside, NAD, no CP, substernal chest discomfort, or substernal burning sensation. ROS per above 82yM  PMHX of former tobacco use (quit 30 years ago), HLD, HTN, PAD (s/p x1 stent in R LE), GERD, and recent NSTEMI on 5/23/23 s/p mRCA JUANI w/planned PCI for LCx lesion in 5 weeks presents to [facility] ED for substernal burning sensation at 4:30pm PTA. Said sxs were similar to her previous NSTEMI event 2 days ago prompting her to return to the ED. Of note, the pt has been taking baby aspirin since d/c on 5/24/23 however she did not yet take her plavix today due to going back to the ED. On ROS, she denies: fevers, chills, myalgias, dizziness, weakness, HA, dysphagia, dysarthria, changes in vision, CP/chest discomfort, palpitations, SOB, cough, PND, orthopnea, N/V/D/C, abdominal pain, dysuria, urinary urgency/increased frequency, changes in bowel movements, melena, hematochezia, LE edema, joint pain, or unintentional weightloss. ROS otherwise negative.      Pt seen and examined at bedside, NAD, no CP, substernal chest discomfort, or substernal burning sensation. ROS per above

## 2023-05-25 NOTE — ED PROVIDER NOTE - OBJECTIVE STATEMENT
PMHX of PCI to mid RCA done a few days ago,   former tobacco use (quit 30 years ago), HLD, HTN, PAD (s/p x1 stent in R LE), and GERD presented to [facility] ED today (5/23/23) c/o indigestion nausea/vomiting here for chest pain today.pt feeling better now. no active cp. denies fever, chils, sweats

## 2023-05-25 NOTE — ED ADULT NURSE NOTE - MUSCULOSKELETAL ASSESSMENT
Physical Therapy: Initial Evaluation    Patient: Kiko Almanza (36 y.o. female)   Examination Date:   Plan of Care Certification Period: 6/15/2022 to        :  2009 ;    Confirmed: Yes MRN: [id number]  CSN: [id number]   Insurance: Payor: Jennie Blevins / Plan: Elizabet Moran / Product Type: *No Product type* /   Insurance ID: [id number] - (Medicaid Managed) Secondary Insurance (if applicable):    Referring Physician: MD Dr. Donya Tapia   PCP: Emma Lakhani MD Visits to Date/Visits Approved:   /      No Show/Cancelled Appts:   /       Medical Diagnosis: stress fracture R ischium R ischium fx  Treatment Diagnosis: Decreased activity tolerance limited by L hip pain     PERTINENT MEDICAL HISTORY   Patient Assessed for Rehabilitation Services: Yes  Self reported health status[de-identified] Good    Medical History: Chart Reviewed: Yes   Past Medical History:   Diagnosis Date    Anxiety     Dysmenorrhea      Surgical History:   Past Surgical History:   Procedure Laterality Date    TONSILLECTOMY         Medications:   Current Outpatient Medications:     EPINEPHrine ([place] 2-ADDI) 0.15 MG/0.3ML SOAJ, Inject 0.3 mLs into the muscle once for 1 dose Use as directed for allergic reaction, Disp: 2 Device, Rfl: 0  Allergies: Fish-derived products      SUBJECTIVE EXAMINATION     History obtained from[de-identified] Chart Review,Patient,      Family/Caregiver Present: Yes (Mother)    Subjective History:    Subjective: Pt reports she has been walking without crutches since she thoguht PT was supposed to fit her. States it will hurt sometimes, but not every day. Doctor told her she did not want her on cabrera feet for 4 weeks following the appointment. Discussed having her go buy through a medical equipment store. Bothers her to walk her dog, only around the [place]ock, does not bother her to go up/down the stairs. States the pain is worse sleeping on her side.  Does not recall follow-up with doctor  Additional Pertinent Hx (if applicable):     Prior diagnostic testing[de-identified] X-ray,MRI  Previous treatments prior to current episode?: Outpatient PT  Any changes to allergies, medications, or have you had any medical procedures/ER visits since your last visit?: No      Learning/Language: Learning  Does the patient/guardian have any barriers to learning?: No barriers  Will there be a co-learner?: No  What is the preferred language of the patient/guardian?: English  Is an  required?: No  How does the patient/guardian prefer to learn new concepts?: Listening,Reading,Demonstration     Pain Screening   Pain Screening  Patient Currently in Pain: Yes  Pain Assessment: 0-10  Pain Level: 2  Best Pain Level: 0  Worst Pain Level: 4  Patient's Stated Pain Goal: 0 - No pain  Pain Type: Acute pain  Pain Location: Hip  Pain Orientation: Left  Pain Radiating Towards: No N/T  Pain Descriptors: Sore,Dull  Pain Frequency: Intermittent  Pain Onset: Awakened from sleep  Functional Pain Assessment: Prevents or interferes with many active not passive activities    Functional Status    Dominant Hand: : Right    Social History:  Social History  Lives With: Family    Occupation/Interests:  Type of Occupation: student    Prior Level of Function:    Independent     Current Level of Function:  Mod I      ADL Assistance: Independent  Homemaking Assistance: Independent  Ambulation Assistance: Independent  Transfer Assistance: Independent  Active : No  Patient's  Info:  Mother    OBJECTIVE EXAMINATION   Restrictions:  Restrictions/Precautions: Weight Bearing     Lower Extremity Weight Bearing Restrictions  Right Lower Extremity Weight Bearing: Toe Touch Weight Bearing  Left Lower Extremity Weight Bearing: Toe Touch Weight Bearing    Review of Systems:  Vision: Within Functional Limits  Hearing: Within functional limits  Overall Orientation Status: Within Normal Limits  Follows Commands: Within Functional Limits    Observations:  General Observations  Description: Patient arrives ambulating without AD and no antalgic gait noted    Palpation:   No tenderness along anterior hip joint and ASIS; reported pain more along inner groin region     Left AROM  Right AROM       WNL     WNL        Left PROM  Right PROM       WNL     WNL        Left Strength  Right Strength         Strength LLE  L Hip Flexion: 5/5  L Hip ABduction: 4/5  L Hip ADduction: 3+/5 (increased pain along inner groin)  L Hip Internal Rotation: 5/5  L Hip External Rotation: 4-/5  L Knee Flexion: 5/5  L Knee Extension: 5/5    Strength RLE  R Hip Flexion: 5/5  R Hip ABduction: 4/5  R Hip ADduction: 4/5  R Hip Internal Rotation: 5/5  R Hip External Rotation: 4-/5  R Knee Flexion: 5/5  R Knee Extension: 5/5     Muscle Length/Flexibility:  WNL hamstring and hip flexor mobility    Special Tests:   Special Tests: (-) CAPRICE/HEIDY    Additional Finding(s) (if applicable): Other: SLR: no pain on the RLE or LLE with and without resistance     ASSESSMENT     Impression: Assessment: Pt is 11year old female with who returns to therapy after completing 4 visits back in April for bilateral hip pain. Her symptoms were not improving and had MRI and x-ray completed; MRI showed a stress fracture of the L pubic ramus. Told to complete 4 weeks at [facility] with crutches; at the time of this evaluation patient had not received crutches and recommended to call her physicians office for order to be sent in. Pt has been doing some exercises during her time off of therapy, states the pain has improved significant in her R hip, but continues to come and go in her L hip. Pt demo deficits this date that include reduced hip/pelvic strength, fair core stability, increased pain along inner portion of groin with end range flexion, ability to complete SLR without increased pain.   Pt will benefit with PT services with progressive return to WB activities, walk/jog program, core stability, hip/pelvic strengthening, modalities as needed, gait training to return to PLOF. Pt prior to onset of current condition had no pain with able to complete full ADLs and work activities. Pt would like to return for next years tracka nd field season. Body Structures, Functions, Activity Limitations Requiring Skilled Therapeutic Intervention: Decreased functional mobility ,Decreased ADL status,Decreased ROM,Increased pain,Decreased endurance,Decreased balance,Decreased strength    Statement of Medical Necessity: Physical Therapy is both indicated and medically necessary as outlined in the POC to increase the likelihood of meeting the functionally related goals stated below.      Patient's Activity Tolerance: Patient tolerated evaluation without incident      Patient's rehabilitation potential/prognosis is considered to be: Good    Factors which may impact rehabilitation potential include: None  Measures taken to address barrier(s): N/A     GOALS   Patient Goal(s): decrease pain and return to running activity  Patient goals : deccrease pain, run w/o pain  Short term goals  Time Frame for Short term goals: 4 weeks  Short term goal 1: Pt will be able to report at least 25-50% reduction in pain    Short term goal 2: Pt will be able to advance ROM and strength activity w/o pain  Short term goal 3: Pt demo 5/5 BLE strength in all directions     Long term goals  Time Frame for Long term goals : 8 weeks     Long term goal 1: Pt will be independent w/ HEP and be able to continue to progress and manage on his/her own  Long term goal 2: Pt will be able to return to walk/jog program w/o pain       TREATMENT PLAN       Requires PT Follow-Up: Yes    Pt. actively involved in establishing Plan of Care and Goals: Yes  Patient/ Caregiver education and instruction: [facility],Home Exercise Program,Disease Specific [address] Education discussed appropriate way to receive order for crutches and current WB status       Treatment may include any combination of the following: Strengthening,ROM,Balance training,Stair training,Pain management,Modalities,Neuromuscular re-education,Manual Therapy - Soft Tissue Mobilization,Manual Therapy - Joint Manipulation,Endurance training,Therapeutic activities     Frequency / Duration:  Patient to be seen 1x for 8 weeks weeks      Eval Complexity:    Decision Making: Low Complexity     Therapist Signature: Laura Nguyen, PT    Date: 4/17/5058     I certify that the above Therapy Services are being furnished while the patient is under my care. I agree with the treatment plan and certify that this therapy is necessary. Physician's Signature:  ___________________________   Date:_______                                                                   Nato Lucas MD        Physician Comments: _______________________________________________    Please sign and return to [address] [place] [address]. Please fax to the location listed below.  Annmarie Pool for this referral!    [address] [facility] [address], [address] [address]  Dept: [phone]  Loc: [phone]       POC NOTE - - -

## 2023-05-25 NOTE — H&P ADULT - NSHPSOCIALHISTORY_GEN_ALL_CORE
Marital Status:  (   )    (   ) Single    (   )    (  )   Lives with: (  ) alone  (  ) children   (  ) spouse   (  ) parents  (  ) other  Recent Travel: No recent travel  Occupation:  Mobility:   Funcational status:  Substance Use (street drugs): ( x ) never used  (  ) other:  Tobacco Usage:  ( x  ) never smoked   (   ) former smoker   (   ) current smoker  (     ) pack year  Alcohol Usage: None Marital Status:  (   )    (   ) Single    (   )    (  )   Lives with: ( x ) alone  (  ) children   (  ) spouse   (  ) parents  (  ) other  Recent Travel: No recent travel  Occupation: retired  Mobility: no deficits  Functional status: intact ADLs and IADLs  Substance Use (street drugs): ( x ) never used  (  ) other:  Tobacco Usage:  ( x  ) never smoked   (   ) former smoker   (   ) current smoker  (     ) pack year  Alcohol Usage: None Marital Status:  (   )    ( x  ) Single    (   )    (  )   Lives with: ( x ) alone  (  ) children   (  ) spouse   (  ) parents  (  ) other  Recent Travel: No recent travel  Occupation: retired  Mobility: no deficits  Functional status: intact ADLs and IADLs  Substance Use (street drugs): ( x ) never used  (  ) other:  Tobacco Usage:  ( x  ) never smoked   (   ) former smoker   (   ) current smoker  (     ) pack year  Alcohol Usage: None

## 2023-05-25 NOTE — H&P ADULT - PROBLEM SELECTOR PLAN 1
Recent NSTEMI on 5/23/23 s/p mRCA JUANI w/planned PCI for LCx lesion in 5 weeks, pt did not take plavix today, no acute ischemic changes on admitting and repeat serial ECGs, elevated troponin T w/normal CKMB likely myocardial injury 2/2 reperfusion.   -Repeat TTE not indicated  -C/w ASA 81mg/Plavix 75mg qd  -C/w high intensity statin  -Trend CE q6h until peak x2  -Interventional cardiology recs appreciated: no evidence of ischemia, trend CE Recent NSTEMI on 5/23/23 s/p mRCA JUANI w/planned PCI for LCx lesion in 5 weeks, pt did not take Plavix today, no acute ischemic changes on admitting and repeat serial ECGs, elevated troponin T w/normal CKMB likely myocardial injury 2/2 reperfusion.   -Repeat TTE not indicated, TTE on 5/24 s/f normal LV fx w/grade 2 diastolic dysfunction   -C/w ASA 81mg/Plavix 75mg qd  -c/w toprol 25mg qd  -C/w high intensity statin  -Trend CE q6h until peak x2  -Interventional cardiology recs appreciated: no evidence of ischemia, trend CE

## 2023-05-26 ENCOUNTER — TRANSCRIPTION ENCOUNTER (OUTPATIENT)
Age: 83
End: 2023-05-26

## 2023-05-26 VITALS — TEMPERATURE: 99 F

## 2023-05-26 LAB
ANION GAP SERPL CALC-SCNC: 11 MMOL/L — SIGNIFICANT CHANGE UP (ref 5–17)
BUN SERPL-MCNC: 17 MG/DL — SIGNIFICANT CHANGE UP (ref 7–23)
CALCIUM SERPL-MCNC: 8.6 MG/DL — SIGNIFICANT CHANGE UP (ref 8.4–10.5)
CHLORIDE SERPL-SCNC: 103 MMOL/L — SIGNIFICANT CHANGE UP (ref 96–108)
CK MB CFR SERPL CALC: 3.3 NG/ML — SIGNIFICANT CHANGE UP (ref 0–6.7)
CK SERPL-CCNC: 84 U/L — SIGNIFICANT CHANGE UP (ref 25–170)
CO2 SERPL-SCNC: 26 MMOL/L — SIGNIFICANT CHANGE UP (ref 22–31)
CREAT SERPL-MCNC: 0.75 MG/DL — SIGNIFICANT CHANGE UP (ref 0.5–1.3)
EGFR: 78 ML/MIN/1.73M2 — SIGNIFICANT CHANGE UP
GLUCOSE SERPL-MCNC: 99 MG/DL — SIGNIFICANT CHANGE UP (ref 70–99)
HCT VFR BLD CALC: 43.3 % — SIGNIFICANT CHANGE UP (ref 34.5–45)
HGB BLD-MCNC: 14 G/DL — SIGNIFICANT CHANGE UP (ref 11.5–15.5)
MAGNESIUM SERPL-MCNC: 2.1 MG/DL — SIGNIFICANT CHANGE UP (ref 1.6–2.6)
MCHC RBC-ENTMCNC: 30.6 PG — SIGNIFICANT CHANGE UP (ref 27–34)
MCHC RBC-ENTMCNC: 32.3 GM/DL — SIGNIFICANT CHANGE UP (ref 32–36)
MCV RBC AUTO: 94.7 FL — SIGNIFICANT CHANGE UP (ref 80–100)
NRBC # BLD: 0 /100 WBCS — SIGNIFICANT CHANGE UP (ref 0–0)
PHOSPHATE SERPL-MCNC: 3.6 MG/DL — SIGNIFICANT CHANGE UP (ref 2.5–4.5)
PLATELET # BLD AUTO: 206 K/UL — SIGNIFICANT CHANGE UP (ref 150–400)
POTASSIUM SERPL-MCNC: 3.6 MMOL/L — SIGNIFICANT CHANGE UP (ref 3.5–5.3)
POTASSIUM SERPL-SCNC: 3.6 MMOL/L — SIGNIFICANT CHANGE UP (ref 3.5–5.3)
RBC # BLD: 4.57 M/UL — SIGNIFICANT CHANGE UP (ref 3.8–5.2)
RBC # FLD: 14.5 % — SIGNIFICANT CHANGE UP (ref 10.3–14.5)
SODIUM SERPL-SCNC: 140 MMOL/L — SIGNIFICANT CHANGE UP (ref 135–145)
TROPONIN T SERPL-MCNC: 2.22 NG/ML — CRITICAL HIGH (ref 0–0.01)
WBC # BLD: 9.69 K/UL — SIGNIFICANT CHANGE UP (ref 3.8–10.5)
WBC # FLD AUTO: 9.69 K/UL — SIGNIFICANT CHANGE UP (ref 3.8–10.5)

## 2023-05-26 PROCEDURE — 99239 HOSP IP/OBS DSCHRG MGMT >30: CPT

## 2023-05-26 RX ORDER — RANOLAZINE 500 MG/1
1 TABLET, FILM COATED, EXTENDED RELEASE ORAL
Qty: 30 | Refills: 5
Start: 2023-05-26

## 2023-05-26 RX ORDER — RANOLAZINE 500 MG/1
500 TABLET, FILM COATED, EXTENDED RELEASE ORAL
Refills: 0 | Status: DISCONTINUED | OUTPATIENT
Start: 2023-05-26 | End: 2023-05-26

## 2023-05-26 RX ADMIN — AMLODIPINE BESYLATE 2.5 MILLIGRAM(S): 2.5 TABLET ORAL at 06:15

## 2023-05-26 RX ADMIN — PANTOPRAZOLE SODIUM 40 MILLIGRAM(S): 20 TABLET, DELAYED RELEASE ORAL at 06:13

## 2023-05-26 RX ADMIN — Medication 81 MILLIGRAM(S): at 11:42

## 2023-05-26 RX ADMIN — HEPARIN SODIUM 5000 UNIT(S): 5000 INJECTION INTRAVENOUS; SUBCUTANEOUS at 06:15

## 2023-05-26 RX ADMIN — Medication 25 MILLIGRAM(S): at 06:13

## 2023-05-26 RX ADMIN — RANOLAZINE 500 MILLIGRAM(S): 500 TABLET, FILM COATED, EXTENDED RELEASE ORAL at 10:03

## 2023-05-26 NOTE — DISCHARGE NOTE PROVIDER - NSDCMRMEDTOKEN_GEN_ALL_CORE_FT
amLODIPine 2.5 mg oral tablet: 1 tab(s) orally once a day  aspirin 81 mg oral delayed release tablet: 1 tab(s) orally once a day  atorvastatin 80 mg oral tablet: 1 tab(s) orally once a day (at bedtime)  clopidogrel 75 mg oral tablet: 1 tab(s) orally once a day  metoprolol succinate 25 mg oral tablet, extended release: 1 tab(s) orally once a day  PriLOSEC OTC 20 mg oral delayed release tablet: 1 tab(s) orally once a day  ranolazine 500 mg oral tablet, extended release: 1 tab(s) orally 2 times a day

## 2023-05-26 NOTE — DISCHARGE NOTE NURSING/CASE MANAGEMENT/SOCIAL WORK - PATIENT PORTAL LINK FT
You can access the FollowMyHealth Patient Portal offered by [facility] by registering at the following website: [email or web address]. By joining [facility]’s FollowMyHealth portal, you will also be able to view your health information using other applications (apps) compatible with our system.

## 2023-05-26 NOTE — DISCHARGE NOTE PROVIDER - HOSPITAL COURSE
82yM  PMHX of former tobacco use (quit 30 years ago), HLD, HTN, PAD (s/p x1 stent in R LE), GERD, and recent NSTEMI on 5/23/23 s/p mRCA JUANI w/planned PCI for LCx lesion in 5 weeks presents to [facility] ED for substernal burning sensation at 4:30pm PTA. Said sxs were similar to her previous NSTEMI event 2 days ago prompting her to return to the ED. Of note, the pt has been taking baby aspirin since d/c on 5/24/23 however she did not yet take her plavix today due to going back to the ED.      On the day of discharge, the patient was seen and examined. Symptoms improved. Vital signs are stable. Labs and imaging reviewed. Pt is to follow up with cardiologist Dr Mcgrath in 1-2 weeks for post discharge check-up. Pt instructed to return to ED/seek immediate medical attention if symptoms of chest pain, SOB, LOC, bleeding from the access site. Pt agrees with the discharge plan, verbalizes understanding of the information given. All new medications explained risks/side effects and e-prescribed to patient preferred pharmacy. Not referred for Cardiac Rehab 2/2 pending stage PCI. Interventional cardiology recs appreciated: no evidence of ischemia recommended dc'ing on Ranexa 500 BID    Myocardial injury.   ·  Plan: Recent NSTEMI on 5/23/23 s/p mRCA JUANI w/planned PCI for LCx lesion in 5 weeks, pt did not take Plavix today, no acute ischemic changes on admitting and repeat serial ECGs, elevated troponin T w/normal CKMB likely myocardial injury 2/2 reperfusion. Interventional cardiology recs appreciated: no evidence of ischemia recommended dc'ing on Ranexa 500 BID  -Repeat TTE not indicated, TTE on 5/24 s/f normal LV fx w/grade 2 diastolic dysfunction   -C/w ASA 81mg/Plavix 75mg qd  -c/w toprol 25mg qd  -C/w high intensity statin  -START Ranexa 500 BID     Problem/Plan - 2:  ·  Problem: Ischemic cardiomyopathy.   ·  Plan: see plan above.     Problem/Plan - 3:  ·  Problem: Hypertension.   ·  Plan: hx of HTN on amlodipine  -c/w amlodipine 2.5mg w/hold parameters

## 2023-05-26 NOTE — DISCHARGE NOTE PROVIDER - NSDCCPCAREPLAN_GEN_ALL_CORE_FT
PRINCIPAL DISCHARGE DIAGNOSIS  Diagnosis: CAD (coronary artery disease)  Assessment and Plan of Treatment: You came into the hospital with chest pain in the setting of recent  cardiac catheterization where 1 drug-eluting cardiac stent was placed to blockage in the Right Coronary Artery. You were started on new medication called Ranexa to help w/ chest pain. You will need to take antiplatelet medications Asprin and Plavix daily for the cardiac stent. DO NOT STOP taking these medications unless directed by your cardiologist as this can be LIFE THREATENING and lead to closing up of the cardiac stent and lead to a heart attack! 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

## 2023-05-26 NOTE — PATIENT PROFILE ADULT - FALL HARM RISK - HARM RISK INTERVENTIONS
Assistance with ambulation/Assistance OOB with selected safe patient handling equipment/Communicate Risk of Fall with Harm to all staff/Discuss with provider need for PT consult/Monitor gait and stability/Provide patient with walking aids - walker, cane, crutches/Reinforce activity limits and safety measures with patient and family/Sit up slowly, dangle for a short time, stand at bedside before walking/Tailored Fall Risk Interventions/Use of alarms - bed, chair and/or voice tab/Visual Cue: Yellow wristband and red socks/Bed in lowest position, wheels locked, appropriate side rails in place/Call bell, personal items and telephone in reach/Instruct patient to call for assistance before getting out of bed or chair/Non-slip footwear when patient is out of bed/[place] to call system/Physically safe environment - no spills, clutter or unnecessary equipment/Purposeful Proactive Rounding/Room/bathroom lighting operational, light cord in reach

## 2023-05-26 NOTE — PATIENT PROFILE ADULT - FUNCTIONAL ASSESSMENT - BASIC MOBILITY ASSESSMENT TYPE
Admission For information on Fall & Injury Prevention, visit: [email or web address] OR  [email or web address] OR  https://www.cdc.gov/steadi/patient.html

## 2023-05-26 NOTE — DISCHARGE NOTE PROVIDER - CARE PROVIDER_API CALL
Matteo Mcgrath  Cardiovascular Disease  [address] [facility], 3rd [place] [address]  Phone: [phone]  Fax: [phone]  Follow Up Time: 1 week

## 2023-05-26 NOTE — DISCHARGE NOTE PROVIDER - NSDCFUSCHEDAPPT_GEN_ALL_CORE_FT
Matteo Mcgrath  [facility] Physician [facility]  [facility] [address]  Scheduled Appointment: 06/06/2023

## 2023-05-27 ENCOUNTER — TRANSCRIPTION ENCOUNTER (OUTPATIENT)
Age: 83
End: 2023-05-27

## 2023-05-30 ENCOUNTER — TRANSCRIPTION ENCOUNTER (OUTPATIENT)
Age: 83
End: 2023-05-30

## 2023-05-31 ENCOUNTER — APPOINTMENT (OUTPATIENT)
Dept: CARE COORDINATION | Facility: HOME HEALTH | Age: 83
End: 2023-05-31
Payer: MEDICARE

## 2023-05-31 VITALS
OXYGEN SATURATION: 96 % | SYSTOLIC BLOOD PRESSURE: 125 MMHG | RESPIRATION RATE: 18 BRPM | HEART RATE: 80 BPM | DIASTOLIC BLOOD PRESSURE: 70 MMHG

## 2023-05-31 DIAGNOSIS — E78.5 HYPERLIPIDEMIA, UNSPECIFIED: ICD-10-CM

## 2023-05-31 DIAGNOSIS — Z88.2 ALLERGY STATUS TO SULFONAMIDES: ICD-10-CM

## 2023-05-31 DIAGNOSIS — I25.84 CORONARY ATHEROSCLEROSIS DUE TO CALCIFIED CORONARY LESION: ICD-10-CM

## 2023-05-31 DIAGNOSIS — I10 ESSENTIAL (PRIMARY) HYPERTENSION: ICD-10-CM

## 2023-05-31 DIAGNOSIS — Z87.891 PERSONAL HISTORY OF NICOTINE DEPENDENCE: ICD-10-CM

## 2023-05-31 DIAGNOSIS — I21.4 NON-ST ELEVATION (NSTEMI) MYOCARDIAL INFARCTION: ICD-10-CM

## 2023-05-31 DIAGNOSIS — Z88.0 ALLERGY STATUS TO PENICILLIN: ICD-10-CM

## 2023-05-31 DIAGNOSIS — K21.9 GASTRO-ESOPHAGEAL REFLUX DISEASE WITHOUT ESOPHAGITIS: ICD-10-CM

## 2023-05-31 DIAGNOSIS — Z79.02 LONG TERM (CURRENT) USE OF ANTITHROMBOTICS/ANTIPLATELETS: ICD-10-CM

## 2023-05-31 DIAGNOSIS — Z79.82 LONG TERM (CURRENT) USE OF ASPIRIN: ICD-10-CM

## 2023-05-31 DIAGNOSIS — I73.9 PERIPHERAL VASCULAR DISEASE, UNSPECIFIED: ICD-10-CM

## 2023-05-31 PROCEDURE — 99349 HOME/RES VST EST MOD MDM 40: CPT

## 2023-05-31 NOTE — HISTORY OF PRESENT ILLNESS
[Spouse] : spouse [FreeTextEntry1] : Follow-up for discharge from [facility] for AMI.\par  [de-identified] : Patient is a 82 year old female enrolled in the [employer] program with a history of former tobacco use (quit 30 years ago), HLD, HTN, PAD (s/p x1 stent in R LE), and GERD . Patient was admitted from 5/23/23 - 5/26/23 to [facility] for a diagnosis of AMI. \par \[facility] chart reviewed and copied as per [facility] Discharge Summary:\par "81 y/o English speaking F with PMHX of former tobacco use (quit 30 years ago), HLD, HTN, PAD (s/p x1 stent in R LE), and GERD presented to [facility] ED (5/23/23) c/o indigestion nausea/vomiting associated with pressure like-SSCP w/ +rad to jaw/L arm, SOB and dizzinessx 1 day. Patient took her home Pepcid without relief of symptoms. Patient reports indigestion sx with associated CP has been intermittent for the last month but a stronger pressure-like CP sensation along with new onset of dizziness prompted her ED. PT also reports an increase in b/l LE swelling. EKG NSR at 83 bpm, with left axis deviation and biphasic T waves in leads III, AVF and TWI in V5, V6. -Labs: H&H stable, Toponin I (4622.3 ->6008.7, pro-BNP 2686. S/p Heparin gtt, Zofran 4 mg IVP,  mg PO (via EMS), NS 200cc/hr continuously during transfer. Patient transferred to [facility] for further treatment of NSTEMI. Pt underwent Cardiac cath (5/23/23) w/ Dr Hare: JUANI mRCA; LM normal, pLAD 60%, mLAD 60%, mLCx 80% D1 mild disease; LVEF 40%, LVEDP 21mmHg. S/p RHC (5/23/23): RA 8, RV 47/8, PA 43/18 (30), PCWP 23, PASat 66%, CO 4.25, CI 2.6. Underwent Pulmonary angiogram intraprocedure given hypoxia: negative for PE in pulm arteries. ACCESS: R CFA PC; R CFV 7Fr sheath removed - sites CDI w/o hematoma/oozing. S/p lasix 20mg IV x1 in procedure. Pt w/ c/w ASA/Plavix, Atorvastatin 80mg PO qHS. . Plan to return for staged PCI of LCx in 5wks. Echo w/ EF 55-60%. mild conc LVH, grade 2 diastolic dysfunction, mildly dilated LA, mild-mod MR. Hypoxia improved w/ use of incentive spirometer, SpO2 88% -> 95% RA. Pt reports recent Abx treatment for PNA w/ ?Levaquin ~2wks ago. On the day of discharge, the patient was seen and examined. Symptoms improved. Vital signs are stable. Labs and imaging reviewed. Pt is to follow up with cardiologist Dr Mcgrath in 1-2 weeks for post discharge check-up. Pt instructed to return to ED/seek immediate medical attention if symptoms of chest pain, SOB, LOC, bleeding from the access site. Pt agrees with the discharge plan, verbalizes understanding of the information given. All new medications explained risks/side effects and e-prescribed to patient preferred pharmacy. Not referred for Cardiac Rehab 2/2 pending stage PCI. \par \par Patient was readmitted from 5/25/23 - 5/26/23 for c/o chest pain, full work up unremarkable and was discharged home with no changes to plan of care. \par \par Patient observed via home visit and is alert and oriented x 3, in no acute distress. Patient states they are feeling well today. Patient states they are eating and drinking well. Patient observed sitting comfortably upright at desk chair with partner present during time of visit. Patient reports compliance with medications. States she has f/u appointment with cardiology on 6/6/23. Patient states she is currently recovering from a URI and has a residual cough that is improving. Denies any fever, chills, abdominal pain, palpitations, nausea, vomiting, diarrhea, lightheadedness, dizziness, shortness of breath, or chest pain.\par

## 2023-05-31 NOTE — PHYSICAL EXAM
[No Acute Distress] : no acute distress [Well Nourished] : well nourished [Well Developed] : well developed [Well-Appearing] : well-appearing [Normal Sclera/Conjunctiva] : normal sclera/conjunctiva [PERRL] : pupils equal round and reactive to light [Normal Outer Ear/Nose] : the outer ears and nose were normal in appearance [Normal Oropharynx] : the oropharynx was normal [No JVD] : no jugular venous distention [No Lymphadenopathy] : no lymphadenopathy [No Respiratory Distress] : no respiratory distress  [No Accessory Muscle Use] : no accessory muscle use [Clear to Auscultation] : lungs were clear to auscultation bilaterally [Normal Rate] : normal rate  [Regular Rhythm] : with a regular rhythm [Normal S1, S2] : normal S1 and S2 [No Abdominal Bruit] : a ~M bruit was not heard ~T in the abdomen [No Varicosities] : no varicosities [No Edema] : there was no peripheral edema [No Extremity Clubbing/Cyanosis] : no extremity clubbing/cyanosis [Soft] : abdomen soft [Non Tender] : non-tender [Non-distended] : non-distended [Normal Bowel Sounds] : normal bowel sounds [No CVA Tenderness] : no CVA  tenderness [No Joint Swelling] : no joint swelling [Grossly Normal Strength/Tone] : grossly normal strength/tone [No Rash] : no rash [Normal Gait] : normal gait [Normal Affect] : the affect was normal [Normal Insight/Judgement] : insight and judgment were intact

## 2023-05-31 NOTE — REVIEW OF SYSTEMS
[Fever] : no fever [Chills] : no chills [Fatigue] : fatigue [Hot Flashes] : no hot flashes [Night Sweats] : no night sweats [Chest Pain] : no chest pain [Palpitations] : no palpitations [Leg Claudication] : no leg claudication [Lower Ext Edema] : no lower extremity edema [Orthopnea] : no orthopnea [Shortness Of Breath] : no shortness of breath [Wheezing] : no wheezing [Cough] : cough [Dyspnea on Exertion] : no dyspnea on exertion [Abdominal Pain] : no abdominal pain [Nausea] : no nausea [Constipation] : no constipation [Diarrhea] : diarrhea [Vomiting] : no vomiting [Dysuria] : no dysuria [Incontinence] : no incontinence [Nocturia] : no nocturia [Hematuria] : no hematuria [Joint Pain] : no joint pain [Joint Stiffness] : no joint stiffness [Back Pain] : no back pain [Itching] : no itching [Skin Rash] : no skin rash [Headache] : no headache [Dizziness] : no dizziness [Fainting] : no fainting [Memory Loss] : no memory loss [Suicidal] : not suicidal [Insomnia] : no insomnia [Anxiety] : no anxiety [Easy Bleeding] : no easy bleeding [Easy Bruising] : no easy bruising [Negative] : ENT

## 2023-05-31 NOTE — ASSESSMENT
[FreeTextEntry1] : Patient is a 82 year old female enrolled in the [facility] program with a history of former tobacco use (quit 30 years ago), HLD, HTN, PAD (s/p x1 stent in R LE), and GERD . Patient was admitted from 5/23/23 - 5/26/23 to [facility] for a diagnosis of AMI. \par \[facility] chart reviewed and copied as per [facility] Discharge Summary:\par "81 y/o English speaking F with PMHX of former tobacco use (quit 30 years ago), HLD, HTN, PAD (s/p x1 stent in R LE), and GERD presented to [facility] ED (5/23/23) c/o indigestion nausea/vomiting associated with pressure like-SSCP w/ +rad to jaw/L arm, SOB and dizzinessx 1 day. Patient took her home Pepcid without relief of symptoms. Patient reports indigestion sx with associated CP has been intermittent for the last month but a stronger pressure-like CP sensation along with new onset of dizziness prompted her ED. PT also reports an increase in b/l LE swelling. EKG NSR at 83 bpm, with left axis deviation and biphasic T waves in leads III, AVF and TWI in V5, V6. -Labs: H&H stable, Toponin I (4622.3 ->6008.7, pro-BNP 2686. S/p Heparin gtt, Zofran 4 mg IVP,  mg PO (via EMS), NS 200cc/hr continuously during transfer. Patient transferred to [facility] for further treatment of NSTEMI. Pt underwent Cardiac cath (5/23/23) w/ Dr Hare: JUANI mRCA; LM normal, pLAD 60%, mLAD 60%, mLCx 80% D1 mild disease; LVEF 40%, LVEDP 21mmHg. S/p RHC (5/23/23): RA 8, RV 47/8, PA 43/18 (30), PCWP 23, PASat 66%, CO 4.25, CI 2.6. Underwent Pulmonary angiogram intraprocedure given hypoxia: negative for PE in pulm arteries. ACCESS: R CFA PC; R CFV 7Fr sheath removed - sites CDI w/o hematoma/oozing. S/p lasix 20mg IV x1 in procedure. Pt w/ c/w ASA/Plavix, Atorvastatin 80mg PO qHS. . Plan to return for staged PCI of LCx in 5wks. Echo w/ EF 55-60%. mild conc LVH, grade 2 diastolic dysfunction, mildly dilated LA, mild-mod MR. Hypoxia improved w/ use of incentive spirometer, SpO2 88% -> 95% RA. Pt reports recent Abx treatment for PNA w/ ?Levaquin ~2wks ago. On the day of discharge, the patient was seen and examined. Symptoms improved. Vital signs are stable. Labs and imaging reviewed. Pt is to follow up with cardiologist Dr Mcgrath in 1-2 weeks for post discharge check-up. Pt instructed to return to ED/seek immediate medical attention if symptoms of chest pain, SOB, LOC, bleeding from the access site. Pt agrees with the discharge plan, verbalizes understanding of the information given. All new medications explained risks/side effects and e-prescribed to patient preferred pharmacy. Not referred for Cardiac Rehab 2/2 pending stage PCI. \par \par Patient was readmitted from 5/25/23 - 5/26/23 for c/o chest pain, full work up unremarkable and was discharged home with no changes to plan of care. \par \par Patient observed via home visit and is alert and oriented x 3, in no acute distress. Patient states they are feeling well today. Patient states they are eating and drinking well. Patient observed sitting comfortably upright at desk chair with partner present during time of visit. Patient reports compliance with medications. States she has f/u appointment with cardiology on 6/6/23. Patient states she is currently recovering from a URI and has a residual cough that is improving. Denies any fever, chills, abdominal pain, palpitations, nausea, vomiting, diarrhea, lightheadedness, dizziness, shortness of breath, or chest pain.

## 2023-06-02 ENCOUNTER — TRANSCRIPTION ENCOUNTER (OUTPATIENT)
Age: 83
End: 2023-06-02

## 2023-06-05 ENCOUNTER — TRANSCRIPTION ENCOUNTER (OUTPATIENT)
Age: 83
End: 2023-06-05

## 2023-06-06 ENCOUNTER — NON-APPOINTMENT (OUTPATIENT)
Age: 83
End: 2023-06-06

## 2023-06-06 ENCOUNTER — APPOINTMENT (OUTPATIENT)
Dept: HEART AND VASCULAR | Facility: CLINIC | Age: 83
End: 2023-06-06
Payer: MEDICARE

## 2023-06-06 VITALS
SYSTOLIC BLOOD PRESSURE: 134 MMHG | WEIGHT: 161.13 LBS | DIASTOLIC BLOOD PRESSURE: 77 MMHG | HEART RATE: 97 BPM | BODY MASS INDEX: 32.48 KG/M2 | OXYGEN SATURATION: 90 % | TEMPERATURE: 97.6 F | HEIGHT: 59 IN

## 2023-06-06 PROCEDURE — 93000 ELECTROCARDIOGRAM COMPLETE: CPT

## 2023-06-06 PROCEDURE — 99214 OFFICE O/P EST MOD 30 MIN: CPT | Mod: 25

## 2023-06-06 RX ORDER — CLOPIDOGREL BISULFATE 75 MG/1
75 TABLET, FILM COATED ORAL DAILY
Qty: 90 | Refills: 3 | Status: ACTIVE | COMMUNITY
Start: 1900-01-01 | End: 1900-01-01

## 2023-06-06 RX ORDER — METOPROLOL SUCCINATE 25 MG/1
25 TABLET, EXTENDED RELEASE ORAL DAILY
Qty: 90 | Refills: 3 | Status: ACTIVE | COMMUNITY
Start: 1900-01-01 | End: 1900-01-01

## 2023-06-06 RX ORDER — ASPIRIN 325 MG
TABLET ORAL
Refills: 0 | Status: COMPLETED | COMMUNITY
End: 2023-06-06

## 2023-06-06 RX ORDER — ASPIRIN 81 MG/1
81 TABLET, DELAYED RELEASE ORAL DAILY
Qty: 90 | Refills: 3 | Status: ACTIVE | COMMUNITY
Start: 2023-06-06 | End: 1900-01-01

## 2023-06-06 RX ORDER — ATORVASTATIN CALCIUM 80 MG/1
80 TABLET, FILM COATED ORAL
Qty: 90 | Refills: 3 | Status: ACTIVE | COMMUNITY
Start: 1900-01-01 | End: 1900-01-01

## 2023-06-06 NOTE — DISCUSSION/SUMMARY
[FreeTextEntry1] : CAD s/p PCI cont DAPT will discussed staged PCI as planned\par HTN - KIMBERLEE  and I had an extensive discussion regarding his blood pressure management. Patient will continue taking current medications in addition to maintaining a low Na diet, with periodic b/p checks at home.\par HLD KIMBERLEE and I discussed his lipid panel and individualized target LDL goal. At this point, will do diet and exercise with anticipation of re-evaluating labs in 3-6 months\par SOB check echo and carotid if able to approve and schedule.\par EKG SR no ST T changes [EKG obtained to assist in diagnosis and management of assessed problem(s)] : EKG obtained to assist in diagnosis and management of assessed problem(s)

## 2023-06-06 NOTE — REASON FOR VISIT
[Symptom and Test Evaluation] : symptom and test evaluation [FreeTextEntry1] : 82 year old woman comes in after a recent admission to [facility] secondary to chest discomfort, in the setting of NSTEMI. No chest pain. Dyspnea and fatigue noted on this follow up. This is often noted with activity. Symptoms always improve at rest.\par We are discussing medical management.

## 2023-06-16 ENCOUNTER — TRANSCRIPTION ENCOUNTER (OUTPATIENT)
Age: 83
End: 2023-06-16

## 2023-06-19 ENCOUNTER — TRANSCRIPTION ENCOUNTER (OUTPATIENT)
Age: 83
End: 2023-06-19

## 2023-06-20 ENCOUNTER — TRANSCRIPTION ENCOUNTER (OUTPATIENT)
Age: 83
End: 2023-06-20

## 2023-06-26 ENCOUNTER — TRANSCRIPTION ENCOUNTER (OUTPATIENT)
Age: 83
End: 2023-06-26

## 2023-06-26 ENCOUNTER — APPOINTMENT (OUTPATIENT)
Dept: HEART AND VASCULAR | Facility: CLINIC | Age: 83
End: 2023-06-26
Payer: MEDICARE

## 2023-06-26 VITALS
TEMPERATURE: 98.2 F | BODY MASS INDEX: 32.46 KG/M2 | HEIGHT: 59 IN | SYSTOLIC BLOOD PRESSURE: 153 MMHG | OXYGEN SATURATION: 82 % | DIASTOLIC BLOOD PRESSURE: 92 MMHG | WEIGHT: 161 LBS | HEART RATE: 78 BPM

## 2023-06-26 VITALS — SYSTOLIC BLOOD PRESSURE: 132 MMHG | DIASTOLIC BLOOD PRESSURE: 80 MMHG

## 2023-06-26 DIAGNOSIS — I21.4 NON-ST ELEVATION (NSTEMI) MYOCARDIAL INFARCTION: ICD-10-CM

## 2023-06-26 DIAGNOSIS — E78.5 HYPERLIPIDEMIA, UNSPECIFIED: ICD-10-CM

## 2023-06-26 DIAGNOSIS — I10 ESSENTIAL (PRIMARY) HYPERTENSION: ICD-10-CM

## 2023-06-26 DIAGNOSIS — I73.9 PERIPHERAL VASCULAR DISEASE, UNSPECIFIED: ICD-10-CM

## 2023-06-26 PROCEDURE — 93880 EXTRACRANIAL BILAT STUDY: CPT

## 2023-06-26 PROCEDURE — 93306 TTE W/DOPPLER COMPLETE: CPT

## 2023-06-26 PROCEDURE — 99214 OFFICE O/P EST MOD 30 MIN: CPT

## 2023-06-26 RX ORDER — AMLODIPINE BESYLATE 5 MG/1
5 TABLET ORAL DAILY
Qty: 30 | Refills: 5 | Status: ACTIVE | COMMUNITY
Start: 1900-01-01 | End: 1900-01-01

## 2023-06-26 NOTE — DISCUSSION/SUMMARY
[FreeTextEntry1] : s/p CVA check echo and carotid if able to approve and schedule. cont anticoagulation, neuro follow up\par HTN - KIMBERLEE  and I had an extensive discussion regarding his blood pressure management. Patient will continue taking current medications in addition to maintaining a low Na diet, with periodic b/p checks at home.\par HLD KIMBERLEE and I discussed his lipid panel and individualized target LDL goal. At this point, will do diet and exercise with anticipation of re-evaluating labs in 3-6 months\par

## 2023-06-26 NOTE — REASON FOR VISIT
[Symptom and Test Evaluation] : symptom and test evaluation [FreeTextEntry1] : 82 year old woman comes in after a recent trip to ER with anxiety attack. No chest pain, dyspnea or palpitations noted.

## 2023-06-27 NOTE — ED PROVIDER NOTE - WR INTERPRETED BY 2
Continue Regimen: Triamcinolone 0.1% cream BID PRN until resolved\\nPrednisone 10mg take 4 PO QD for 3 days, take 3 PO QD for 3 days, take 2 PO QD for 1 week, take 1 PO QD for 1 week (finish taper)\\nOTC Benadryl PRN Render In Strict Bullet Format?: No Detail Level: Zone Kenyatta Sullivan

## 2023-06-28 ENCOUNTER — TRANSCRIPTION ENCOUNTER (OUTPATIENT)
Age: 83
End: 2023-06-28

## 2023-06-28 NOTE — ED ADULT NURSE REASSESSMENT NOTE - CONDITION
unchanged [Subsequent Evaluation] : a subsequent evaluation for [FreeTextEntry2] : sensation of plugged left ear, nasal lesion, swollen tonsils

## 2023-06-29 ENCOUNTER — TRANSCRIPTION ENCOUNTER (OUTPATIENT)
Age: 83
End: 2023-06-29

## 2023-06-30 ENCOUNTER — TRANSCRIPTION ENCOUNTER (OUTPATIENT)
Age: 83
End: 2023-06-30

## 2023-07-17 ENCOUNTER — APPOINTMENT (OUTPATIENT)
Dept: HEART AND VASCULAR | Facility: CLINIC | Age: 83
End: 2023-07-17

## 2023-07-26 ENCOUNTER — APPOINTMENT (OUTPATIENT)
Dept: HEART AND VASCULAR | Facility: CLINIC | Age: 83
End: 2023-07-26

## 2023-07-28 ENCOUNTER — APPOINTMENT (OUTPATIENT)
Dept: HEART AND VASCULAR | Facility: CLINIC | Age: 83
End: 2023-07-28

## 2023-11-08 ENCOUNTER — EMERGENCY (EMERGENCY)
Facility: HOSPITAL | Age: 83
LOS: 1 days | End: 2023-11-08
Attending: EMERGENCY MEDICINE | Admitting: EMERGENCY MEDICINE
Payer: MEDICARE

## 2023-11-08 VITALS — TEMPERATURE: 100 F

## 2023-11-08 VITALS — WEIGHT: 179.9 LBS

## 2023-11-08 DIAGNOSIS — Z98.89 OTHER SPECIFIED POSTPROCEDURAL STATES: Chronic | ICD-10-CM

## 2023-11-08 PROCEDURE — 92950 HEART/LUNG RESUSCITATION CPR: CPT

## 2023-11-08 PROCEDURE — 99285 EMERGENCY DEPT VISIT HI MDM: CPT | Mod: 25

## 2023-11-08 NOTE — ED ADULT NURSE NOTE - NSFALLUNIVINTERV_ED_ALL_ED
Bed/Stretcher in lowest position, wheels locked, appropriate side rails in place/Call bell, personal items and telephone in reach/Instruct patient to call for assistance before getting out of bed/chair/stretcher/Non-slip footwear applied when patient is off stretcher/[place] to call system/Physically safe environment - no spills, clutter or unnecessary equipment/Purposeful proactive rounding/Room/bathroom lighting operational, light cord in reach

## 2023-11-08 NOTE — ED PROVIDER NOTE - PHYSICAL EXAMINATION
CONSTITUTIONAL: Well developed, unresponsive, intubated, compressions in progress, + severe distress  HEAD: NCAT  ENT: orally intubated with Filippo tube, No bleeding, small amount of secretions  EYES: Fixed and dilated  NECK: Supple  RESPIRATORY: + b/l chest rise, being bagged, symmetric lung sounds  CARDIO: pulseless  ABD: mild distension  MSK: No e/o trauma, + mottled and pulseless throughout  NEURO: Unresponsive  SKIN: Cool and mottled

## 2023-11-08 NOTE — ED ADULT TRIAGE NOTE - CHIEF COMPLAINT QUOTE
Pt BIBEMS NOTIFICATION BY EMS for cardiac arrest. As per EMS  witnessed colapse with HHA. ACLS initiated.

## 2023-11-08 NOTE — ED PROVIDER NOTE - OBJECTIVE STATEMENT
83-year-old female with a reported history per EMS of CAD with a recently placed cardiac stent on aspirin and Plavix, brain cancer, hypertension and hyperlipidemia brought in by EMS in cardiac arrest.  Patient was intubated in the field using a Filippo tube, and chest compressions in progress.  Patient was found in PEA and converted to V-tach after 1 round of epinephrine.  Patient was shocked a total of 5 times prior to arrival and given 7 rounds of epinephrine, 1 round of sodium bicarbonate, and 300 mg of amiodarone.  The last 2 pulse checks revealed a rhythm of PEA.  Per EMS patient was awake and alert at home with her home health aide and collapsed suddenly at 4:30 PM.  EMS arrived around 5 PM.  No chest compressions have been attempted until that time.

## 2023-11-08 NOTE — ED ADULT NURSE NOTE - OBJECTIVE STATEMENT
82 y/o female biba for cardiac arrest. witnessed collapse by HHA- h/o CAD, cancer, brain tumor, recent stent placement- pt intubated PTA by EMS with CPR in progress

## 2023-11-08 NOTE — ED PROVIDER NOTE - CARE PLAN
Anesthesia Post Evaluation    Patient: Ryder Mendoza    Procedure(s) Performed: Procedure(s) (LRB):  COLONOSCOPY (N/A)    Final Anesthesia Type: general      Patient location during evaluation: GI PACU  Patient participation: Yes- Able to Participate  Level of consciousness: awake and alert  Post-procedure vital signs: reviewed and stable  Pain management: adequate  Airway patency: patent    PONV status at discharge: No PONV  Anesthetic complications: no      Cardiovascular status: blood pressure returned to baseline  Respiratory status: unassisted, spontaneous ventilation and room air  Hydration status: euvolemic  Follow-up not needed.          Vitals Value Taken Time   /74 05/23/23 0946   Temp 36.6 °C (97.9 °F) 05/23/23 0925   Pulse 67 05/23/23 0946   Resp 16 05/23/23 0946   SpO2 97 % 05/23/23 0946         Event Time   Out of Recovery 09:40:00         Pain/Nydia Score: Nydia Score: 10 (5/23/2023  9:46 AM)         Principal Discharge DX:	Cardiac arrest   1

## 2023-11-10 DIAGNOSIS — Z95.5 PRESENCE OF CORONARY ANGIOPLASTY IMPLANT AND GRAFT: ICD-10-CM

## 2023-11-10 DIAGNOSIS — Z79.02 LONG TERM (CURRENT) USE OF ANTITHROMBOTICS/ANTIPLATELETS: ICD-10-CM

## 2023-11-10 DIAGNOSIS — I10 ESSENTIAL (PRIMARY) HYPERTENSION: ICD-10-CM

## 2023-11-10 DIAGNOSIS — I25.10 ATHEROSCLEROTIC HEART DISEASE OF NATIVE CORONARY ARTERY WITHOUT ANGINA PECTORIS: ICD-10-CM

## 2023-11-10 DIAGNOSIS — Z85.841 PERSONAL HISTORY OF MALIGNANT NEOPLASM OF BRAIN: ICD-10-CM

## 2023-11-10 DIAGNOSIS — Z88.0 ALLERGY STATUS TO PENICILLIN: ICD-10-CM

## 2023-11-10 DIAGNOSIS — I46.9 CARDIAC ARREST, CAUSE UNSPECIFIED: ICD-10-CM

## 2023-11-10 DIAGNOSIS — E78.5 HYPERLIPIDEMIA, UNSPECIFIED: ICD-10-CM

## 2023-11-10 DIAGNOSIS — Z79.82 LONG TERM (CURRENT) USE OF ASPIRIN: ICD-10-CM

## 2023-11-10 DIAGNOSIS — Z88.2 ALLERGY STATUS TO SULFONAMIDES: ICD-10-CM

## 2023-11-10 LAB
GLUCOSE BLDC GLUCOMTR-MCNC: 168 MG/DL — HIGH (ref 70–99)
GLUCOSE BLDC GLUCOMTR-MCNC: 168 MG/DL — HIGH (ref 70–99)

## 2024-11-27 NOTE — PATIENT PROFILE ADULT - FUNCTIONAL ASSESSMENT - BASIC MOBILITY 2.
Ambulatory w/complaint of left neck pain which started Saturday; denies history of same; taking tylenol and motrin w/some relief; denies accident, injury, trauma, falls and/or MVA; denies headache and/or vision changes; denies N/V, fever, chills and/or diarrhea; states recent illness with cold-like symptoms; denies sick contacts  
2 = A lot of assistance

## 2025-04-10 NOTE — ED CLERICAL - NS ED CLERK NOTE PRE-ARRIVAL INFORMATION; PCP CONTACT INFORMATION
----- Message from Alejandra Mercedes MD sent at 4/10/2025 12:34 PM CDT -----  Mela Rudloph,     Can you please assist with starting a prior authorization for zepbound? Thanks so much.   [phone]
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
